# Patient Record
Sex: FEMALE | Race: WHITE | NOT HISPANIC OR LATINO | Employment: OTHER | URBAN - NONMETROPOLITAN AREA
[De-identification: names, ages, dates, MRNs, and addresses within clinical notes are randomized per-mention and may not be internally consistent; named-entity substitution may affect disease eponyms.]

---

## 2021-04-08 DIAGNOSIS — Z23 ENCOUNTER FOR IMMUNIZATION: ICD-10-CM

## 2021-04-16 ENCOUNTER — APPOINTMENT (EMERGENCY)
Dept: CT IMAGING | Facility: HOSPITAL | Age: 71
End: 2021-04-16
Payer: MEDICARE

## 2021-04-16 ENCOUNTER — HOSPITAL ENCOUNTER (EMERGENCY)
Facility: HOSPITAL | Age: 71
Discharge: HOME | End: 2021-04-16
Attending: EMERGENCY MEDICINE | Admitting: EMERGENCY MEDICINE
Payer: MEDICARE

## 2021-04-16 VITALS
HEIGHT: 62 IN | WEIGHT: 240 LBS | OXYGEN SATURATION: 98 % | RESPIRATION RATE: 20 BRPM | TEMPERATURE: 97.5 F | BODY MASS INDEX: 44.16 KG/M2 | HEART RATE: 88 BPM

## 2021-04-16 DIAGNOSIS — M54.9 BACK PAIN: Primary | ICD-10-CM

## 2021-04-16 DIAGNOSIS — R10.9 ABDOMINAL PAIN: ICD-10-CM

## 2021-04-16 LAB
ALBUMIN SERPL BCP-MCNC: 3.9 G/DL (ref 3.5–5)
ALP SERPL-CCNC: 68 U/L (ref 46–116)
ALT SERPL W P-5'-P-CCNC: 27 U/L (ref 12–78)
ANION GAP SERPL CALCULATED.3IONS-SCNC: 9 MMOL/L (ref 4–13)
AST SERPL W P-5'-P-CCNC: 15 U/L (ref 5–45)
BASOPHILS # BLD AUTO: 0.03 THOUSANDS/ΜL (ref 0–0.1)
BASOPHILS NFR BLD AUTO: 0 % (ref 0–1)
BILIRUB SERPL-MCNC: 0.33 MG/DL (ref 0.2–1)
BILIRUB UR QL STRIP: NEGATIVE
BUN SERPL-MCNC: 18 MG/DL (ref 5–25)
CALCIUM SERPL-MCNC: 9.7 MG/DL (ref 8.3–10.1)
CHLORIDE SERPL-SCNC: 101 MMOL/L (ref 100–108)
CLARITY UR: CLEAR
CO2 SERPL-SCNC: 32 MMOL/L (ref 21–32)
COLOR UR: YELLOW
CREAT SERPL-MCNC: 1.04 MG/DL (ref 0.6–1.3)
EOSINOPHIL # BLD AUTO: 0.16 THOUSAND/ΜL (ref 0–0.61)
EOSINOPHIL NFR BLD AUTO: 1 % (ref 0–6)
ERYTHROCYTE [DISTWIDTH] IN BLOOD BY AUTOMATED COUNT: 13 % (ref 11.6–15.1)
GFR SERPL CREATININE-BSD FRML MDRD: 54 ML/MIN/1.73SQ M
GLUCOSE SERPL-MCNC: 118 MG/DL (ref 65–140)
GLUCOSE UR STRIP-MCNC: NEGATIVE MG/DL
HCT VFR BLD AUTO: 45.1 % (ref 34.8–46.1)
HGB BLD-MCNC: 14.7 G/DL (ref 11.5–15.4)
HGB UR QL STRIP.AUTO: NEGATIVE
IMM GRANULOCYTES # BLD AUTO: 0.03 THOUSAND/UL (ref 0–0.2)
IMM GRANULOCYTES NFR BLD AUTO: 0 % (ref 0–2)
KETONES UR STRIP-MCNC: NEGATIVE MG/DL
LEUKOCYTE ESTERASE UR QL STRIP: NEGATIVE
LYMPHOCYTES # BLD AUTO: 3.57 THOUSANDS/ΜL (ref 0.6–4.47)
LYMPHOCYTES NFR BLD AUTO: 30 % (ref 14–44)
MCH RBC QN AUTO: 30 PG (ref 26.8–34.3)
MCHC RBC AUTO-ENTMCNC: 32.6 G/DL (ref 31.4–37.4)
MCV RBC AUTO: 92 FL (ref 82–98)
MONOCYTES # BLD AUTO: 0.96 THOUSAND/ΜL (ref 0.17–1.22)
MONOCYTES NFR BLD AUTO: 8 % (ref 4–12)
NEUTROPHILS # BLD AUTO: 7.11 THOUSANDS/ΜL (ref 1.85–7.62)
NEUTS SEG NFR BLD AUTO: 61 % (ref 43–75)
NITRITE UR QL STRIP: NEGATIVE
NRBC BLD AUTO-RTO: 0 /100 WBCS
PH UR STRIP.AUTO: 7 [PH]
PLATELET # BLD AUTO: 349 THOUSANDS/UL (ref 149–390)
PMV BLD AUTO: 9 FL (ref 8.9–12.7)
POTASSIUM SERPL-SCNC: 3.1 MMOL/L (ref 3.5–5.3)
PROT SERPL-MCNC: 8.4 G/DL (ref 6.4–8.2)
PROT UR STRIP-MCNC: NEGATIVE MG/DL
RBC # BLD AUTO: 4.9 MILLION/UL (ref 3.81–5.12)
SODIUM SERPL-SCNC: 142 MMOL/L (ref 136–145)
SP GR UR STRIP.AUTO: 1.01 (ref 1–1.03)
UROBILINOGEN UR QL STRIP.AUTO: 0.2 E.U./DL
WBC # BLD AUTO: 11.86 THOUSAND/UL (ref 4.31–10.16)

## 2021-04-16 PROCEDURE — 36415 COLL VENOUS BLD VENIPUNCTURE: CPT | Performed by: EMERGENCY MEDICINE

## 2021-04-16 PROCEDURE — 99284 EMERGENCY DEPT VISIT MOD MDM: CPT | Performed by: EMERGENCY MEDICINE

## 2021-04-16 PROCEDURE — 99284 EMERGENCY DEPT VISIT MOD MDM: CPT

## 2021-04-16 PROCEDURE — 81003 URINALYSIS AUTO W/O SCOPE: CPT | Performed by: EMERGENCY MEDICINE

## 2021-04-16 PROCEDURE — 80053 COMPREHEN METABOLIC PANEL: CPT | Performed by: EMERGENCY MEDICINE

## 2021-04-16 PROCEDURE — 85025 COMPLETE CBC W/AUTO DIFF WBC: CPT | Performed by: EMERGENCY MEDICINE

## 2021-04-16 PROCEDURE — 74177 CT ABD & PELVIS W/CONTRAST: CPT

## 2021-04-16 PROCEDURE — 96374 THER/PROPH/DIAG INJ IV PUSH: CPT

## 2021-04-16 RX ORDER — POTASSIUM CHLORIDE 20 MEQ/1
40 TABLET, EXTENDED RELEASE ORAL ONCE
Status: COMPLETED | OUTPATIENT
Start: 2021-04-16 | End: 2021-04-16

## 2021-04-16 RX ORDER — MELOXICAM 15 MG/1
15 TABLET ORAL DAILY
Qty: 30 TABLET | Refills: 0 | Status: SHIPPED | OUTPATIENT
Start: 2021-04-16

## 2021-04-16 RX ORDER — METOPROLOL SUCCINATE 50 MG/1
50 TABLET, EXTENDED RELEASE ORAL DAILY
COMMUNITY

## 2021-04-16 RX ORDER — KETOROLAC TROMETHAMINE 30 MG/ML
15 INJECTION, SOLUTION INTRAMUSCULAR; INTRAVENOUS ONCE
Status: COMPLETED | OUTPATIENT
Start: 2021-04-16 | End: 2021-04-16

## 2021-04-16 RX ORDER — SIMVASTATIN 20 MG
20 TABLET ORAL
COMMUNITY

## 2021-04-16 RX ORDER — TRIAMTERENE AND HYDROCHLOROTHIAZIDE 37.5; 25 MG/1; MG/1
1 CAPSULE ORAL EVERY MORNING
COMMUNITY

## 2021-04-16 RX ORDER — MULTIVITAMIN
1 CAPSULE ORAL DAILY
COMMUNITY

## 2021-04-16 RX ORDER — PHENOL 1.4 %
600 AEROSOL, SPRAY (ML) MUCOUS MEMBRANE 2 TIMES DAILY WITH MEALS
COMMUNITY

## 2021-04-16 RX ADMIN — IOHEXOL 100 ML: 350 INJECTION, SOLUTION INTRAVENOUS at 19:48

## 2021-04-16 RX ADMIN — KETOROLAC TROMETHAMINE 15 MG: 30 INJECTION, SOLUTION INTRAMUSCULAR at 19:10

## 2021-04-16 RX ADMIN — POTASSIUM CHLORIDE 40 MEQ: 1500 TABLET, EXTENDED RELEASE ORAL at 20:24

## 2021-04-16 NOTE — ED PROVIDER NOTES
History  Chief Complaint   Patient presents with    Back Pain     pt dev thoracic back pain that radiates around both flank areas on Wed Worse today  Patient reports that on Wednesday she began to feel upper back pain which by Thursday seem to radiate down to her both flanks and now has radiated around to her right upper abdomen  Patient reports that she has had no nausea or vomiting  She denies any fevers  She reports the pain as dull and achy  It is not exacerbated by movement  There was no trauma  Patient relates this pain to previous episode of diverticulitis  She reports that she has diverticuli high up  No history of colon cancer  Denies any history of intra-abdominal surgeries  History provided by:  Patient  Back Pain  Location:  Thoracic spine  Quality:  Aching  Radiates to: Right upper abdomen  Pain severity:  Moderate  Pain is:  Same all the time  Onset quality:  Gradual  Duration:  2 days  Timing:  Intermittent  Progression:  Waxing and waning  Context: not falling, not jumping from heights and not lifting heavy objects    Relieved by:  Nothing  Worsened by: Movement  Ineffective treatments:  None tried  Associated symptoms: abdominal pain    Associated symptoms: no chest pain, no dysuria, no fever, no headaches and no weakness    Abdominal pain:     Location:  R flank, L flank and RUQ    Quality: aching and cramping      Severity:  Moderate    Onset quality:  Gradual    Duration:  3 days    Timing:  Intermittent    Progression:  Waxing and waning      Prior to Admission Medications   Prescriptions Last Dose Informant Patient Reported? Taking?    Multiple Vitamin (multivitamin) capsule   Yes Yes   Sig: Take 1 capsule by mouth daily   Potassium 75 MG TABS 4/16/2021 at Unknown time  Yes Yes   Sig: Take 1 tablet by mouth daily   calcium carbonate (OS-KACIE) 600 MG tablet   Yes Yes   Sig: Take 600 mg by mouth 2 (two) times a day with meals   metoprolol succinate (TOPROL-XL) 50 mg 24 hr tablet 2021 at Unknown time  Yes Yes   Sig: Take 50 mg by mouth daily   simvastatin (ZOCOR) 20 mg tablet 4/15/2021 at Unknown time  Yes Yes   Sig: Take 20 mg by mouth daily at bedtime   triamterene-hydrochlorothiazide (DYAZIDE) 37 5-25 mg per capsule 2021 at Unknown time  Yes Yes   Sig: Take 1 capsule by mouth every morning      Facility-Administered Medications: None       Past Medical History:   Diagnosis Date    Arthritis     Asthma     Hypertension        Past Surgical History:   Procedure Laterality Date    BACK SURGERY      HAND FRACTURE REPAIR Right     TONSILLECTOMY         History reviewed  No pertinent family history  I have reviewed and agree with the history as documented  E-Cigarette/Vaping    E-Cigarette Use Never User      E-Cigarette/Vaping Substances     Social History     Tobacco Use    Smoking status: Former Smoker     Quit date: 1990     Years since quittin 0    Smokeless tobacco: Never Used   Substance Use Topics    Alcohol use: Never     Frequency: Never    Drug use: Never       Review of Systems   Constitutional: Positive for appetite change  Negative for activity change, fatigue and fever  HENT: Negative for congestion, ear pain, rhinorrhea, sinus pressure and sore throat  Eyes: Negative  Negative for redness and itching  Respiratory: Negative for cough, chest tightness, shortness of breath and wheezing  Cardiovascular: Negative for chest pain, palpitations and leg swelling  Gastrointestinal: Positive for abdominal pain and constipation  Negative for diarrhea, nausea and vomiting  Endocrine: Negative  Genitourinary: Negative for dysuria, flank pain and frequency  Musculoskeletal: Positive for back pain  Negative for arthralgias and myalgias  Skin: Negative  Negative for pallor and rash  Allergic/Immunologic: Negative  Neurological: Negative for dizziness, weakness and headaches  Hematological: Negative  Psychiatric/Behavioral: Negative  All other systems reviewed and are negative  Physical Exam  Physical Exam  Vitals signs and nursing note reviewed  Constitutional:       General: She is awake  She is not in acute distress  Appearance: Normal appearance  She is well-developed  She is obese  She is not ill-appearing or toxic-appearing  HENT:      Head: Normocephalic and atraumatic  Hair is normal       Jaw: No pain on movement  Right Ear: External ear normal       Left Ear: External ear normal       Nose: Nose normal    Eyes:      General: Lids are normal  No scleral icterus  Extraocular Movements: Extraocular movements intact  Pupils: Pupils are equal, round, and reactive to light  Neck:      Musculoskeletal: Normal range of motion and neck supple  Cardiovascular:      Rate and Rhythm: Normal rate and regular rhythm  Heart sounds: Normal heart sounds  No murmur  Pulmonary:      Effort: Pulmonary effort is normal  No respiratory distress  Breath sounds: Normal breath sounds  No stridor  No wheezing or rales  Abdominal:      General: Abdomen is flat  There is no distension  Palpations: Abdomen is soft  Tenderness: There is no abdominal tenderness  There is no right CVA tenderness, left CVA tenderness or guarding  Musculoskeletal: Normal range of motion  General: No deformity  Skin:     General: Skin is warm and dry  Coloration: Skin is not jaundiced or pale  Findings: No rash  Neurological:      Mental Status: She is alert and oriented to person, place, and time  Mental status is at baseline  Cranial Nerves: No cranial nerve deficit     Psychiatric:         Attention and Perception: Attention normal          Mood and Affect: Mood normal          Speech: Speech normal          Behavior: Behavior normal          Vital Signs  ED Triage Vitals [04/16/21 1829]   Temperature Pulse Respirations BP SpO2   97 5 °F (36 4 °C) 88 20 -- 98 % Temp Source Heart Rate Source Patient Position - Orthostatic VS BP Location FiO2 (%)   Temporal Monitor Sitting Left arm --      Pain Score       7           Vitals:    04/16/21 1829   Pulse: 88   Patient Position - Orthostatic VS: Sitting         Visual Acuity      ED Medications  Medications   potassium chloride (K-DUR,KLOR-CON) CR tablet 40 mEq (has no administration in time range)   ketorolac (TORADOL) injection 15 mg (15 mg Intravenous Given 4/16/21 1910)   iohexol (OMNIPAQUE) 350 MG/ML injection (SINGLE-DOSE) 100 mL (100 mL Intravenous Given 4/16/21 1948)       Diagnostic Studies  Results Reviewed     Procedure Component Value Units Date/Time    Comprehensive metabolic panel [[id number]]  (Abnormal) Collected: 04/16/21 1909    Lab Status: Final result Specimen: Blood from Arm, Right Updated: 04/16/21 1931     Sodium 142 mmol/L      Potassium 3 1 mmol/L      Chloride 101 mmol/L      CO2 32 mmol/L      ANION GAP 9 mmol/L      BUN 18 mg/dL      Creatinine 1 04 mg/dL      Glucose 118 mg/dL      Calcium 9 7 mg/dL      AST 15 U/L      ALT 27 U/L      Alkaline Phosphatase 68 U/L      Total Protein 8 4 g/dL      Albumin 3 9 g/dL      Total Bilirubin 0 33 mg/dL      eGFR 54 ml/min/1 73sq m     Narrative:      [place] guidelines for Chronic Kidney Disease (CKD):     Stage 1 with normal or high GFR (GFR > 90 mL/min/1 73 square meters)    Stage 2 Mild CKD (GFR = 60-89 mL/min/1 73 square meters)    Stage 3A Moderate CKD (GFR = 45-59 mL/min/1 73 square meters)    Stage 3B Moderate CKD (GFR = 30-44 mL/min/1 73 square meters)    Stage 4 Severe CKD (GFR = 15-29 mL/min/1 73 square meters)    Stage 5 End Stage CKD (GFR <15 mL/min/1 73 square meters)  Note: GFR calculation is accurate only with a steady state creatinine    UA w Reflex to Microscopic w Reflex to Culture [[id number]] Collected: 04/16/21 1908    Lab Status: Final result Specimen: Urine, Clean Catch Updated: 04/16/21 1918     Color, UA Yellow     Clarity, UA Clear     Specific Gravity, UA 1 015     pH, UA 7 0     Leukocytes, UA Negative     Nitrite, UA Negative     Protein, UA Negative mg/dl      Glucose, UA Negative mg/dl      Ketones, UA Negative mg/dl      Urobilinogen, UA 0 2 E U /dl      Bilirubin, UA Negative     Blood, UA Negative    CBC and differential [[id number]]  (Abnormal) Collected: 04/16/21 1909    Lab Status: Final result Specimen: Blood from Arm, Right Updated: 04/16/21 1916     WBC 11 86 Thousand/uL      RBC 4 90 Million/uL      Hemoglobin 14 7 g/dL      Hematocrit 45 1 %      MCV 92 fL      MCH 30 0 pg      MCHC 32 6 g/dL      RDW 13 0 %      MPV 9 0 fL      Platelets 987 Thousands/uL      nRBC 0 /100 WBCs      Neutrophils Relative 61 %      Immat GRANS % 0 %      Lymphocytes Relative 30 %      Monocytes Relative 8 %      Eosinophils Relative 1 %      Basophils Relative 0 %      Neutrophils Absolute 7 11 Thousands/µL      Immature Grans Absolute 0 03 Thousand/uL      Lymphocytes Absolute 3 57 Thousands/µL      Monocytes Absolute 0 96 Thousand/µL      Eosinophils Absolute 0 16 Thousand/µL      Basophils Absolute 0 03 Thousands/µL                  CT abdomen pelvis with contrast   Final Result by Grady Lucero MD (04/16 2003)      Abutting and inseparable from the gastric wall in the left upper quadrant is a well rounded solid lesion measuring 2 4 x 2 4 cm  This could represent a splenule (unusual location) or gastric wall mass  Recommend nonemergent splenic nuclear medicine    scintigraphy for further evaluation  Fatty liver  Workstation performed: [id number]                    Procedures  Procedures         ED Course                             SBIRT 20yo+      Most Recent Value   SBIRT (23 yo +)   In order to provide better care to our patients, we are screening all of our patients for alcohol and drug use  Would it be okay to ask you these screening questions?   Yes Filed at: 04/16/2021 5650   Initial Alcohol Screen: US AUDIT-C    1  How often do you have a drink containing alcohol?  0 Filed at: 04/16/2021 1839   2  How many drinks containing alcohol do you have on a typical day you are drinking? 0 Filed at: 04/16/2021 1839   3b  FEMALE Any Age, or MALE 65+: How often do you have 4 or more drinks on one occassion? 0 Filed at: 04/16/2021 1839   Audit-C Score  0 Filed at: 04/16/2021 1839   BAKARI: How many times in the past year have you    Used an illegal drug or used a prescription medication for non-medical reasons? Never Filed at: 04/16/2021 1839                    MDM  Number of Diagnoses or Management Options  Abdominal pain: new and requires workup  Back pain: new and requires workup     Amount and/or Complexity of Data Reviewed  Clinical lab tests: ordered and reviewed  Tests in the radiology section of CPT®: ordered and reviewed    Risk of Complications, Morbidity, and/or Mortality  Presenting problems: moderate  Diagnostic procedures: moderate  Management options: moderate    Patient Progress  Patient progress: stable      Disposition  Final diagnoses:   Back pain   Abdominal pain     Time reflects when diagnosis was documented in both MDM as applicable and the Disposition within this note     Time User Action Codes Description Comment    4/16/2021  7:38 PM Levorn Poot Add [M54 9] Back pain     4/16/2021  7:38 PM Levorn Poot Add [R10 9] Abdominal pain       ED Disposition     ED Disposition Condition Date/Time Comment    Discharge Stable Fri Apr 16, 2021  8:15 PM [address] discharge to home/self care  Follow-up Information     Follow up With Specialties Details Why Jerome [address] 71 , DO Family Medicine In 2 weeks Even in well; you will need further evaluation of findings on your CT scan   [facility] [facility]            Patient's Medications   Discharge Prescriptions    MELOXICAM (MOBIC) 15 MG TABLET    Take 1 tablet (15 mg total) by mouth daily       Start Date: 4/16/2021 End Date: --       Order Dose: 15 mg       Quantity: 30 tablet    Refills: 0     No discharge procedures on file      PDMP Review     None          ED Provider  Electronically Signed by           Ricardo Barrientos DO  04/16/21 2018

## 2021-04-17 NOTE — DISCHARGE INSTRUCTIONS
There is no findings on your CT scan this evening to account for your pain that you had described  The discomfort may be related to back pain and as such we have prescribed you an anti-inflammatory medication to help with this  Follow-up with your family doctor's very important  There was an unrelated finding on her CT scan tonight near the area of your stomach which may be related to your spleen or the stomach itself  This will require further imaging either through an MRI or nuclear medicine study which your family doctor can arrange for you  Please return with any worsening  Thank you for choosing the emergency department at [facility]  We appreciated the opportunity and privilege to address your healthcare needs  We remain available to you should you require additional evaluation or assistance  We value your feedback and would appreciate the opportunity to address anything you identified as an opportunity to improve or where we excelled  If there are colleagues who deserve special recognition, please let us know! We hope you are feeling better soon! Vaccination is part of comprehensive strategy to prevent the spread of COVID-19  Your doctor today strongly recommends that if the vaccine becomes available to you and you have no contraindications, receiving it would be beneficial to you, your family and the community  Even with the vaccination, it is still important to wear a mask, physically distance and have good hand hygiene  Thank you for helping to prevent the spread!

## 2025-01-08 RX ORDER — POTASSIUM CHLORIDE 1500 MG/1
20 TABLET, EXTENDED RELEASE ORAL
COMMUNITY

## 2025-01-08 RX ORDER — IBUPROFEN 600 MG/1
600 TABLET, FILM COATED ORAL EVERY 6 HOURS PRN
COMMUNITY
End: 2025-01-13 | Stop reason: ALTCHOICE

## 2025-01-13 ENCOUNTER — OFFICE VISIT (OUTPATIENT)
Dept: PODIATRY | Age: 75
End: 2025-01-13
Payer: COMMERCIAL

## 2025-01-13 ENCOUNTER — HOSPITAL ENCOUNTER (OUTPATIENT)
Dept: RADIOLOGY | Facility: CLINIC | Age: 75
Discharge: HOME | End: 2025-01-13
Payer: COMMERCIAL

## 2025-01-13 VITALS — WEIGHT: 236.8 LBS | BODY MASS INDEX: 43.58 KG/M2 | HEIGHT: 62 IN

## 2025-01-13 DIAGNOSIS — M54.9 BACK PAIN WITH HISTORY OF SPINAL SURGERY: ICD-10-CM

## 2025-01-13 DIAGNOSIS — M79.672 PAIN IN BOTH FEET: ICD-10-CM

## 2025-01-13 DIAGNOSIS — M79.671 PAIN IN BOTH FEET: ICD-10-CM

## 2025-01-13 DIAGNOSIS — R20.0 NUMBNESS OF TOES: ICD-10-CM

## 2025-01-13 DIAGNOSIS — M79.672 PAIN IN BOTH FEET: Primary | ICD-10-CM

## 2025-01-13 DIAGNOSIS — Z98.890 BACK PAIN WITH HISTORY OF SPINAL SURGERY: ICD-10-CM

## 2025-01-13 DIAGNOSIS — M79.671 PAIN IN BOTH FEET: Primary | ICD-10-CM

## 2025-01-13 DIAGNOSIS — M19.072 ARTHRITIS OF LEFT FOOT: ICD-10-CM

## 2025-01-13 PROCEDURE — 99203 OFFICE O/P NEW LOW 30 MIN: CPT | Performed by: STUDENT IN AN ORGANIZED HEALTH CARE EDUCATION/TRAINING PROGRAM

## 2025-01-13 PROCEDURE — 73630 X-RAY EXAM OF FOOT: CPT

## 2025-01-13 RX ORDER — NYSTATIN 100000 U/G
CREAM TOPICAL AS NEEDED
COMMUNITY
Start: 2025-01-03

## 2025-01-13 NOTE — PATIENT INSTRUCTIONS
Foot Pain Home Therapy    Wear supportive shoes at all times. Avoid flip-flops, flat sandals, barefoot walking (never walk barefoot, even at home). Generally avoid shoes that are too flexible and bend in the arch. Your shoes should only slightly bend in the toe area, not the middle. Running sneakers are often the best choice.  Supportive sneaker brands: Oshea, On Cloud, Hoka, Altra, New Balance, Asics, Mizuno  [employer] (discount if mention Dr referred)  Fleet Feet [place]  [facility]   [place] [employer] [place]  Supportive daily shoe brands: Vionic, Orthofeet, Fozia, Dansko, Chacos, Barrera, Teva, Birkenstock  Supportive home shoes: Marco Mendoza (recovery slides)

## 2025-01-13 NOTE — ASSESSMENT & PLAN NOTE
Orders:    EMG 2 limb lower extremity; Future    Ambulatory referral to Spine & Pain Management; Future    Orthotics B/L

## 2025-01-13 NOTE — PROGRESS NOTES
Name: Shanika Flowers      : 1950      MRN: [id number]  Encounter Provider: Joaquina Soriano DPM  Encounter Date: 2025   Encounter department: [facility] [place]  :  Assessment & Plan  Pain in both feet    Orders:    XR foot 3+ vw left; Future    XR foot 3+ vw right; Future    EMG 2 limb lower extremity; Future    Orthotics B/L    Ambulatory referral to Physical Therapy; Future    Numbness of toes    Orders:    EMG 2 limb lower extremity; Future    Ambulatory referral to Spine & Pain Management; Future    Orthotics B/L    Back pain with history of spinal surgery    Orders:    Ambulatory referral to Spine & Pain Management; Future    Arthritis of left foot    Orders:    Orthotics B/L    Imaging Reviewed at this visit (I personally reviewed/independently interpreted images and reports in PACS)  XR right and left foot WB 6v 25: No acute osseous abnormalities noted. B/L plantar and posterior calcaneal spurs. Midfoot arthritis. Shortened 1st metatarsal. H/o 5th HT procedure. HTs.       PLAN:  I reviewed clinical exam and radiographic imaging (XR) with patient in detail today. I have discussed with the patient the pathophysiology of this diagnosis and reviewed how the examination correlates with this diagnosis.  Patient has B/L forefoot numbness w/ h/o lower back surgery. EMG B/L LE ordered. Pain mngmt consult ordered.  Left foot pain likely arthritic in nature. FL-guided injection discussed however patient defers  CMOs with metatarsal pads rx'd (also to assist in offloading of metatarsal heads which may cause worsening numbness) via [facility]  F/u 6mo for recheck    History of Present Illness   HPI  Shanika Flowers is a 74 y.o. female who presents to clinic for left foot. Notes pain to left dorsal foot along 1st TMTJ. Notes numbness and sometimes tinglings to toes and plantar feet. Issues for years. Shoes feel better.   H/o CSI for heel pain. No heel pain today.       Review  "of Systems   Constitutional:  Negative for activity change, chills and fever.   HENT: Negative.     Respiratory:  Negative for cough, chest tightness and shortness of breath.    Cardiovascular:  Negative for chest pain and leg swelling.   Endocrine: Negative.    Genitourinary: Negative.    Neurological:  Positive for numbness.   Psychiatric/Behavioral: Negative.  Negative for agitation and behavioral problems.           Objective   Ht 5' 2\" (1.575 m)   Wt 107 kg (236 lb 12.8 oz)   BMI 43.31 kg/m²      Physical Exam  Constitutional:       General: She is not in acute distress.     Appearance: Normal appearance. She is obese. She is not ill-appearing.   Cardiovascular:      Comments: B/L DP pulses 1/4. B/L PT pulses nonpalpable due to edema. Legs to toes warm to cool. Diminished pedal hair. B/L LE mild edema with varicosities.   Pulmonary:      Effort: Pulmonary effort is normal.   Musculoskeletal:         General: Tenderness (dorsal left foot over midfoot) and deformity (HTs) present. Normal range of motion.      Comments: No pain with ROM left TMTJs, STJ, TNJ, NCJ   Skin:     General: Skin is warm.      Capillary Refill: Capillary refill takes less than 2 seconds.      Comments: Atrophic skin to LE - thin, dry and shiny.   No open wounds.     Neurological:      General: No focal deficit present.      Mental Status: She is alert and oriented to person, place, and time.      Comments: + numbness to B/L feet lesser metatarsal heads distal to toes 2-5.   Psychiatric:         Mood and Affect: Mood normal.           "

## 2025-01-13 NOTE — ASSESSMENT & PLAN NOTE
Orders:    XR foot 3+ vw left; Future    XR foot 3+ vw right; Future    EMG 2 limb lower extremity; Future    Orthotics B/L    Ambulatory referral to Physical Therapy; Future

## 2025-01-17 NOTE — PROGRESS NOTES
Orthotic Evaluation    Today's date: 25  Patient name: Shanika Flowers  : 1950  MRN: [id number]  Referring provider: Joaquina Soriano DPM  Dx:   Encounter Diagnosis     ICD-10-CM    1. Pain in both feet  M79.671 Ambulatory referral to Physical Therapy    M79.672                       Subjective:    Shanika presents today for orthotic evaluation. she complains of pain, decreased strength, decreased ROM, decreased endurance, decreased joint mobility, balance dysfunction, impaired sensation, and ambulatory dysfunction with functional activities including ambulation, transfers, and leisure. The patient plans to use her orthotic for walking and standing. The orthotic will be placed in a wide, size Women's 8.0    Objective:    Age: 74 y.o.  Weight:  236#    Foot Posture Index Score    Right Foot  Talar dome - +1  Talonavicular bulge - +1  Medial longitudinal arch - +1  Malleolar curve - +1   Calcaneal eversion - +2  Too many toes - +2  Total Score R = 8    Left Foot  Talar dome - +1  Talonavicular bulge - +1  Medial longitudinal arch - +2  Malleolar curve - +1   Calcaneal eversion - +2  Too many toes - +1  Total Score L = 8    Reference Values  Normal =    0 to +5  Pronated =  +6 to +9 Highly Pronated =  10+  Supinated =   -1 to -4 Highly Supinated = -5 to -12    Objective   PT notes decrease ROM and strength t/o the bilateral feet and ankle with loss of DF, inversion, eversion, and hallux extension with+ pronated feet position with + pain t/o the MT heads of the bilateral feet with + compression test.  PT notes demonstration of impaired gait pattern with decrease tibial advancement, decrease stance on the bilateral feet, decrease step length, and decrease push off with + collapsing of the medial arch during stance with step over.      Assessment:    Patient requires custom foot orthosis with a full length, street cut and wide Modified Shocker with bilateral deep heel cups, bilateral 3* medial wedge, and  bilateral medium met bar to correct altered gait mechanics. Patient is not currently controlled by her current shoe wear. Patient was educated on the design of the custom orthotic and it's ability to offload her current pathology. Patient was also educated on potential shoe wear changes with device, break-in period, and skin checks to avoid potential skin break down.     Orthotic goals:  1) Patient will have custom foot orthoses fitted to her shoe.   2) Patient will be compliant with break-in period of custom foot orthoses as prescribed by PT.   3) Patient will be compliant with custom foot orthoses use as prescribed by PT.     Plan:    Planned therapy interventions: orthotic fitting/training  Duration in visits: 2

## 2025-01-20 ENCOUNTER — EVALUATION (OUTPATIENT)
Dept: PHYSICAL THERAPY | Facility: CLINIC | Age: 75
End: 2025-01-20
Payer: COMMERCIAL

## 2025-01-20 DIAGNOSIS — M79.671 PAIN IN BOTH FEET: ICD-10-CM

## 2025-01-20 DIAGNOSIS — M79.672 PAIN IN BOTH FEET: ICD-10-CM

## 2025-01-20 PROCEDURE — 97760 ORTHOTIC MGMT&TRAING 1ST ENC: CPT | Performed by: PHYSICAL THERAPIST

## 2025-02-05 ENCOUNTER — HOSPITAL ENCOUNTER (OUTPATIENT)
Dept: RADIOLOGY | Facility: CLINIC | Age: 75
Discharge: HOME | End: 2025-02-05
Payer: COMMERCIAL

## 2025-02-05 ENCOUNTER — CONSULT (OUTPATIENT)
Dept: PAIN MEDICINE | Facility: CLINIC | Age: 75
End: 2025-02-05
Payer: COMMERCIAL

## 2025-02-05 VITALS — BODY MASS INDEX: 44.13 KG/M2 | HEIGHT: 62 IN | WEIGHT: 239.8 LBS

## 2025-02-05 DIAGNOSIS — M96.1 POSTLAMINECTOMY SYNDROME OF LUMBAR REGION: ICD-10-CM

## 2025-02-05 DIAGNOSIS — Z98.890 BACK PAIN WITH HISTORY OF SPINAL SURGERY: ICD-10-CM

## 2025-02-05 DIAGNOSIS — M47.26 OTHER SPONDYLOSIS WITH RADICULOPATHY, LUMBAR REGION: ICD-10-CM

## 2025-02-05 DIAGNOSIS — R20.0 NUMBNESS OF TOES: ICD-10-CM

## 2025-02-05 DIAGNOSIS — M54.9 BACK PAIN WITH HISTORY OF SPINAL SURGERY: ICD-10-CM

## 2025-02-05 DIAGNOSIS — E66.01 OBESITY, MORBID (HCC): ICD-10-CM

## 2025-02-05 DIAGNOSIS — M96.1 POSTLAMINECTOMY SYNDROME OF LUMBAR REGION: Primary | ICD-10-CM

## 2025-02-05 PROCEDURE — G2211 COMPLEX E/M VISIT ADD ON: HCPCS | Performed by: ANESTHESIOLOGY

## 2025-02-05 PROCEDURE — 72110 X-RAY EXAM L-2 SPINE 4/>VWS: CPT

## 2025-02-05 PROCEDURE — 99024 POSTOP FOLLOW-UP VISIT: CPT | Performed by: ANESTHESIOLOGY

## 2025-02-05 RX ORDER — GABAPENTIN 300 MG/1
300 CAPSULE ORAL 3 TIMES DAILY
Qty: 90 CAPSULE | Refills: 2 | Status: SHIPPED | OUTPATIENT
Start: 2025-02-05 | End: 2025-02-05

## 2025-02-05 RX ORDER — GABAPENTIN 300 MG/1
300 CAPSULE ORAL 3 TIMES DAILY
Qty: 90 CAPSULE | Refills: 2 | Status: SHIPPED | OUTPATIENT
Start: 2025-02-05

## 2025-02-05 NOTE — PATIENT INSTRUCTIONS
Patient Education     Core Strengthening Exercises on Back or on Hands and Knees   About this topic   Your core muscles are in your chest, back, buttock, and stomach area. They are your abdominal, back, and pelvis muscles. These muscles help keep your body stable when using your arms or legs. They also help with balance and posture. There are many exercises you can do to keep these muscles strong.  If you have back problems like a compression fracture or a ruptured disc, doing some of these exercises could make your problem worse. Some of these exercises may cause lower back pain.  General   Before starting with a program, ask your doctor if you are healthy enough to do these exercises. Your doctor may have you work with a , chiropractor, or physical therapist to make a safe exercise program to meet your needs.  Strengthening Exercises   Strengthening exercises keep your muscles firm and strong. Start by repeating each exercise 2 to 3 times. Work up to doing each exercise 10 times. Try to do the exercises 2 to 3 times each day. Hold each exercise for 3 to 5 seconds. Do all exercises slowly.  Hip lifts ? Lie on your back with your knees bent and feet flat on the floor. Tighten your stomach muscles and push your heels into the floor to lift your buttocks off the floor. Relax.  Pelvic tilts ? Lie on your back with your knees bent and feet flat on the floor. Tighten your stomach muscles and press your lower back down to the floor. Relax.  Straight leg raises lying down ? Lie on your back with one leg straight. Bend your other knee so the foot is flat on the bed. Keeping your leg straight, lift the leg up to the level of your other knee. Lower it back down. Repeat with the other leg.  Knee flex lying down ? Lie on your back with both knees bent and your feet flat on the floor. Tighten your belly muscles. Raise one leg up and back down as if you are marching in slow motion. Keep belly muscles tight while you move  your leg. Switch legs. To make this exercise harder, raise both arms straight up in the air. Tighten your belly muscles. When you raise one leg up, reach the opposite arm over your head. Switch, moving the opposite arm and leg until you have done 10 repetitions on each side.  Abdominal crunches ? Lie on your back with both knees bent. Keep your feet flat on the floor. Place your hands in one of these positions. Try starting with the first position since it is the easiest. As you get better, use the other positions to make it harder.  Crunches with arms at sides.  Crunches with arms across chest.  Crunches with arms behind head. Be careful not to interlock your fingers behind your neck or head while doing crunches. This may add tension to your neck and cause strain.  Look at the ceiling. Tighten your belly muscles and lift your shoulders and upper back off the floor. Breathe out while you are doing this. Lower your shoulders to the floor. Breathe in while you are doing this. Relax your belly muscles all the way before starting another crunch.  Arm and leg lifts on hands and knees ? Start on your hands and knees. With all of these exercises, keep your back as level as possible. If you are having trouble with this, you may want to put a small object on your back such as a book. If it falls off, you are not keeping your back level enough during the exercise.  Lift one arm up to shoulder level and hold. Lower it back down. Now, lift up the other arm and hold.  Lift one leg up and kick it straight out until it is in line with your back and hold. Lower it back down. Now, lift up the other leg and hold.  Lift one arm and the OPPOSITE leg up at the same time and hold. Lower them down. Now, repeat using the other arm and leg. This is a very hard exercise. It may take time to be able to do this.               What will the results be?   Stronger core  Better balance  More toned belly and back muscles  Easier to do daily  activities  Better sports performance  Less low back pain  Helpful tips   Stay active and work out to keep your muscles strong and flexible.  Keep a healthy weight to avoid putting too much stress on your spine. Eat a healthy diet to keep your muscles healthy.  Be sure you do not hold your breath when exercising. This can raise your blood pressure. If you tend to hold your breath, try counting out loud when exercising. If any exercise bothers you, stop right away.  Try walking or cycling at an easy pace for a few minutes to warm up your muscles. Do this again after exercising.  Exercise may be slightly uncomfortable, but you should not have sharp pains. If you do get sharp pains, stop what you are doing. If the sharp pains continue, call your doctor.  Last Reviewed Date   2021-03-18  Consumer Information Use and Disclaimer   This generalized information is a limited summary of diagnosis, treatment, and/or medication information. It is not meant to be comprehensive and should be used as a tool to help the user understand and/or assess potential diagnostic and treatment options. It does NOT include all information about conditions, treatments, medications, side effects, or risks that may apply to a specific patient. It is not intended to be medical advice or a substitute for the medical advice, diagnosis, or treatment of a health care provider based on the health care provider's examination and assessment of a patient’s specific and unique circumstances. Patients must speak with a health care provider for complete information about their health, medical questions, and treatment options, including any risks or benefits regarding use of medications. This information does not endorse any treatments or medications as safe, effective, or approved for treating a specific patient. [employer] and its affiliates disclaim any warranty or liability relating to this information or the use thereof. The use of this information  is governed by the Terms of Use, available at https://www.[employer].com/en/know/clinical-effectiveness-terms   Copyright   Copyright © 2024 [employer] and its affiliates and/or licensors. All rights reserved.

## 2025-02-05 NOTE — PROGRESS NOTES
Assessment  1. Numbness of toes  -     Ambulatory referral to Spine & Pain Management  2. Back pain with history of spinal surgery  -     Ambulatory referral to Spine & Pain Management    Left, greater than right-sided lumbar radicular pain in the L5 dermatomal distribution accompanied by pain limited weakness numbness and paresthesias.  Patient has not yet participated with formal PT since lumbar fusion in 2015. Chronic pain with decreased participation with IADLs over the past 3 months.  Has been taking OTC ibuprofen and tylenol with modest benefit.  5/5 strength bilaterally, positive SLR left-sided. Reflexes 2+.  Additionally there is positive facet loading, left greater than right. Denies any gait instability, saddle anesthesia. No pertinent imaging available to review at this time. Risks, benefits alternatives epidural steroid injections thoroughly discussed with patient.  Handouts provided questions answered to patient's satisfaction.  Lifestyle modifications extensively discussed including diet, exercise and weight loss in addition to core strengthening.  Will proceed with multimodal pain therapy plan as noted below:    Plan  -xray lumbar spine flexion/extension; will f/u result  -gabapentin 300 mg t.i.d. Ordered for patient; counseled regarding sedative effects of taking this medication and provided up titration calendar.  Counseled not to take medication while driving or operating heavy machinery/using stairs  -script provided for formal physical therapy for lumbar radiculopathy; Physician directed home exercise plan as per [employer] demonstrated and handouts provided that patient plans to participate with for 1 hour, twice a week for the next 6 weeks.     There are risks associated with opioid medications, including dependence, addiction and tolerance. The patient understands and agrees to use these medications only as prescribed. Potential side effects of the medications include, but are not limited to,  constipation, drowsiness, addiction, impaired judgment and risk of fatal overdose if not taken as prescribed. The patient was warned against driving while taking sedation medications or operating heavy machinery. The patient voiced understanding. Sharing medications is a felony. At this point in time, the patient is showing no signs of addiction, abuse, diversion or suicidal ideation.     [place] Prescription Drug Monitoring Program report was reviewed and was appropriate      Complete risks and benefits including bleeding, infection, tissue reaction, nerve injury and allergic reaction were discussed. The approach was demonstrated using models and literature was provided. Verbal and written consent was obtained.     My impressions and treatment recommendations were discussed in detail with the patient who verbalized understanding and had no further questions.  Discharge instructions were provided. I personally saw and examined the patient and I agree with the above discussed plan of care.    Review of external notes including PT notes, PCP notes and specialist notes was performed at this visit in addition to review of new ordered imaging and past imaging to develop or modify multidisciplinary pain plan    No orders of the defined types were placed in this encounter.      History of Present Illness    Shanika Flowers is a 74 y.o. female with pmhx of obesity, asthma, HTN presenting with presenting with subacute lumbar radicular pain in the left, greater than right L5 dermatomal distribution. Debilitating pain limited weakness numbness and paresthesias accompany the pain. The patient rates the pain at a 8/10 accompanied by electric shock-like shooting features and crampy burning pain in the aforementioned dermatomal distributions.  The pain is worse in the mornings as well as the end of the day; exertion such as walking for long periods of time seems to exacerbate the pain.  The patient can hardly walk more than  a few blocks without having debilitating pain.  She tries to maintain an active lifestyle and finds that the current degree of pain seems to compromises her efforts.  The pain significantly impacts independent activities of daily living and contributes to significant disability.  She has not yet attempted formal PT.  She has taken nsaids, tylenol with limited relief of the pain as well. She has had good long term relief epidural steroid injections in the past. She denies any bowel or bladder dysfunction/incontinence, saddle anesthesia or gait instability.    I have personally reviewed and/or updated the patient's past medical history, past surgical history, family history, social history, current medications, allergies, and vital signs today.     Review of Systems   Constitutional:  Positive for activity change.   HENT: Negative.     Eyes: Negative.    Respiratory: Negative.     Cardiovascular: Negative.    Gastrointestinal: Negative.    Endocrine: Negative.    Genitourinary: Negative.    Musculoskeletal:  Positive for arthralgias, back pain, gait problem and myalgias.   Skin: Negative.    Allergic/Immunologic: Negative.    Neurological:  Positive for weakness and numbness.   Hematological: Negative.    Psychiatric/Behavioral: Negative.     All other systems reviewed and are negative.      Patient Active Problem List   Diagnosis    Numbness of toes    Arthritis of left foot    Pain in both feet       Past Medical History:   Diagnosis Date    Arthritis     Asthma     Hypertension        Past Surgical History:   Procedure Laterality Date    BACK SURGERY      HAND FRACTURE REPAIR Right     TONSILLECTOMY         History reviewed. No pertinent family history.    Social History     Occupational History    Not on file   Tobacco Use    Smoking status: Former     Current packs/day: 0.00     Types: Cigarettes     Quit date: 1990     Years since quittin.8    Smokeless tobacco: Never   Vaping Use    Vaping status:  "Never Used   Substance and Sexual Activity    Alcohol use: Never    Drug use: Never    Sexual activity: Not on file       Current Outpatient Medications on File Prior to Visit   Medication Sig    calcium carbonate (OS-KACIE) 600 MG tablet Take 600 mg by mouth 2 (two) times a day with meals    metoprolol succinate (TOPROL-XL) 50 mg 24 hr tablet Take 50 mg by mouth daily    Multiple Vitamin (multivitamin) capsule Take 1 capsule by mouth daily    nystatin (MYCOSTATIN) cream Apply topically as needed    potassium chloride (Klor-Con M20) 20 mEq tablet Take 20 mEq by mouth    simvastatin (ZOCOR) 20 mg tablet Take 20 mg by mouth daily at bedtime    triamterene-hydrochlorothiazide (DYAZIDE) 37.5-25 mg per capsule Take 1 capsule by mouth every morning     No current facility-administered medications on file prior to visit.       Allergies   Allergen Reactions    Cephalosporins Hives    Ciprofloxacin Hives    Penicillins Hives         Physical Exam    Ht 5' 2\" (1.575 m)   Wt 109 kg (239 lb 12.8 oz)   BMI 43.86 kg/m²     Constitutional: normal, well developed, well nourished, alert, in no distress and non-toxic and no overt pain behavior. and obese  Eyes: anicteric  HEENT: grossly intact  Neck: supple, symmetric, trachea midline and no masses   Pulmonary:even and unlabored  Cardiovascular:No edema or pitting edema present  Skin:Normal without rashes or lesions and well hydrated  Psychiatric:Mood and affect appropriate  Neurologic:Cranial Nerves II-XII grossly intact Sensation grossly intact; no clonus negative dickey's. Reflexes 2+ and brisk. SLR + right sided  Musculoskeletal:normal gait. 5/5 strength bilaterally with AROM in lower extremities. Difficulty wtith normal heel toe and tip toe walking. Significant pain with lumbar facet loading bilaterally and with lateral spine rotation, ttp over lumbar paraspinal muscles, left greater than right. Negative rafi's test, negative gaenslen's negative SIJ loading " bilaterally.    Imaging    No pertinent imaging available to review at this time.

## 2025-02-14 NOTE — PROGRESS NOTES
Daily Note     Today's date: 2025  Patient name: Shanika Flowers  : 1950  MRN: [id number]  Referring provider: Joaquina Soriano DPM  Dx:   Encounter Diagnosis     ICD-10-CM    1. Pain in both feet  M79.671     M79.672                      Subjective:  Patient reports no issues with initial fitting of the custom orthotics.  Patient verbalizes understanding of proper break in period and is aware to contact the clinic of any issues arise in the future.        Objective: See treatment diary below      Assessment: Tolerated treatment well.  PT notes the patient was instructed in proper break in period with handout provided.  PT notes the patient with no issues with initial fitting of the custom orthotics but patient is aware to contact clinic in the future if any issues arise.        Plan:  DC with Custom Orthotics.

## 2025-02-17 ENCOUNTER — OFFICE VISIT (OUTPATIENT)
Dept: PHYSICAL THERAPY | Facility: CLINIC | Age: 75
End: 2025-02-17
Payer: COMMERCIAL

## 2025-02-17 ENCOUNTER — APPOINTMENT (EMERGENCY)
Dept: RADIOLOGY | Facility: HOSPITAL | Age: 75
End: 2025-02-17
Payer: COMMERCIAL

## 2025-02-17 ENCOUNTER — HOSPITAL ENCOUNTER (EMERGENCY)
Facility: HOSPITAL | Age: 75
Discharge: HOME | End: 2025-02-17
Attending: EMERGENCY MEDICINE
Payer: COMMERCIAL

## 2025-02-17 VITALS
RESPIRATION RATE: 18 BRPM | DIASTOLIC BLOOD PRESSURE: 61 MMHG | HEART RATE: 73 BPM | WEIGHT: 243.61 LBS | TEMPERATURE: 96.7 F | SYSTOLIC BLOOD PRESSURE: 163 MMHG | BODY MASS INDEX: 44.83 KG/M2 | HEIGHT: 62 IN | OXYGEN SATURATION: 95 %

## 2025-02-17 DIAGNOSIS — M79.671 PAIN IN BOTH FEET: Primary | ICD-10-CM

## 2025-02-17 DIAGNOSIS — M79.602 LEFT ARM PAIN: Primary | ICD-10-CM

## 2025-02-17 DIAGNOSIS — M79.672 PAIN IN BOTH FEET: Primary | ICD-10-CM

## 2025-02-17 PROCEDURE — 99283 EMERGENCY DEPT VISIT LOW MDM: CPT

## 2025-02-17 PROCEDURE — L3010 FOOT LONGITUDINAL ARCH SUPPO: HCPCS | Performed by: PHYSICAL THERAPIST

## 2025-02-17 PROCEDURE — 73030 X-RAY EXAM OF SHOULDER: CPT

## 2025-02-17 PROCEDURE — 99284 EMERGENCY DEPT VISIT MOD MDM: CPT | Performed by: EMERGENCY MEDICINE

## 2025-02-17 RX ORDER — LIDOCAINE 50 MG/G
1 PATCH TOPICAL ONCE
Status: DISCONTINUED | OUTPATIENT
Start: 2025-02-17 | End: 2025-02-17 | Stop reason: HOSPADM

## 2025-02-17 RX ORDER — METHOCARBAMOL 500 MG/1
500 TABLET, FILM COATED ORAL 2 TIMES DAILY
Qty: 20 TABLET | Refills: 0 | Status: SHIPPED | OUTPATIENT
Start: 2025-02-17

## 2025-02-17 RX ORDER — ACETAMINOPHEN 325 MG/1
975 TABLET ORAL ONCE
Status: COMPLETED | OUTPATIENT
Start: 2025-02-17 | End: 2025-02-17

## 2025-02-17 RX ORDER — ACETAMINOPHEN 325 MG/1
650 TABLET ORAL EVERY 6 HOURS PRN
Qty: 30 TABLET | Refills: 0 | Status: SHIPPED | OUTPATIENT
Start: 2025-02-17

## 2025-02-17 RX ORDER — IBUPROFEN 800 MG/1
800 TABLET, FILM COATED ORAL 3 TIMES DAILY
Qty: 21 TABLET | Refills: 0 | Status: SHIPPED | OUTPATIENT
Start: 2025-02-17

## 2025-02-17 RX ORDER — METHOCARBAMOL 500 MG/1
500 TABLET, FILM COATED ORAL ONCE
Status: COMPLETED | OUTPATIENT
Start: 2025-02-17 | End: 2025-02-17

## 2025-02-17 RX ADMIN — METHOCARBAMOL 500 MG: 500 TABLET ORAL at 11:29

## 2025-02-17 RX ADMIN — ACETAMINOPHEN 325MG 975 MG: 325 TABLET ORAL at 11:28

## 2025-02-17 RX ADMIN — LIDOCAINE 5% 1 PATCH: 700 PATCH TOPICAL at 11:27

## 2025-02-17 NOTE — ED PROVIDER NOTES
Spoke to the patient. States she has a clogged duct on her left breast. She is having some pain. She developed a 100.4 fever but it was before developing the clogged duct. Denies any current fever or chills. States she took a warm shower, massaged her breast and the clogged duct did not dissolve. Baby is not able to latch. She tried pumping twice today but only got half an ounce out. Advised she apply a warm compress to the breast and massage the area. Then she should place the baby to the affected breast. If baby cannot latch, should try pumping. Advised she take Ibuprofen 600mg q6hrs for the pain. Lactation consultant phone number provider for further recommendations. Instructed she call back if she develops any fever and/or chills or if symptoms do not improve with our and the lactation consultants recommendations. Pt voiced understanding.    Time reflects when diagnosis was documented in both MDM as applicable and the Disposition within this note       Time User Action Codes Description Comment    2/17/2025 11:14 AM Terrance Adan Sabra [M79.602] Left arm pain           ED Disposition       ED Disposition   Discharge    Condition   Stable    Date/Time   Mon Feb 17, 2025 11:34 AM    Comment   Shanika Flowers discharge to home/self care.                   Assessment & Plan       Medical Decision Making  74-year-old female presents to the emergency department with complaint of left arm pain, differential diagnosis include muscle contusion, musculoskeletal strain, sprain, ligamentous injury, fracture, arthritis, rotator cuff tear, will perform x-ray of left shoulder, and humerus, provide patient with pain medication, and prescription for pain medication, and ambulatory referral to orthopedic surgery for further evaluation.  Strict return precautions given.  Patient understands and agrees with treatment plan.    Amount and/or Complexity of Data Reviewed  Radiology: ordered.    Risk  OTC drugs.  Prescription drug management.        ED Course as of 02/17/25 1516   Mon Feb 17, 2025   1513 IMPRESSION:     Possible recent acromion fracture. Correlation with any joint tenderness near the AC joint is advised.     1515 Discussed results with the patient.  Will provide her with a sling, she will still have conservative management, and follow-up with orthopedic surgery.       Medications   acetaminophen (TYLENOL) tablet 975 mg (975 mg Oral Given 2/17/25 1128)   methocarbamol (ROBAXIN) tablet 500 mg (500 mg Oral Given 2/17/25 1129)       ED Risk Strat Scores                            SBIRT 22yo+      Flowsheet Row Most Recent Value   Initial Alcohol Screen: US AUDIT-C     1. How often do you have a drink containing alcohol? 0 Filed at: 02/17/2025 1129   2. How many drinks containing alcohol do you have on a typical day you are drinking?  0 Filed at: 02/17/2025 1129    3b. FEMALE Any Age, or MALE 65+: How often do you have 4 or more drinks on one occassion? 0 Filed at: 2025   Audit-C Score 0 Filed at: 2025   BAKARI: How many times in the past year have you...    Used an illegal drug or used a prescription medication for non-medical reasons? Never Filed at: 2025 112                            History of Present Illness       Chief Complaint   Patient presents with    Arm Pain     Pt presented to this ED from home c/o left upper arm pain over past week. Pt stated she injured arm falling onto extremity couple months ago. Denies reinjury/trauma. Taking tylenol w/o relief.        Past Medical History:   Diagnosis Date    Arthritis     Asthma     Hypertension       Past Surgical History:   Procedure Laterality Date    BACK SURGERY      HAND FRACTURE REPAIR Right     TONSILLECTOMY        History reviewed. No pertinent family history.   Social History     Tobacco Use    Smoking status: Former     Current packs/day: 0.00     Types: Cigarettes     Quit date: 1990     Years since quittin.8    Smokeless tobacco: Never   Vaping Use    Vaping status: Never Used   Substance Use Topics    Alcohol use: Never    Drug use: Never      E-Cigarette/Vaping    E-Cigarette Use Never User       E-Cigarette/Vaping Substances      I have reviewed and agree with the history as documented.     74-year-old female presents to the emergency department with complaint of left arm pain.  Patient states that in November, she had a fall on a cement floor where she put her left arm in front of her face to make sure that she did not hit her head.  Patient states that over the past couple of months, she has had gradual worsening of her left arm pain.  Patient states that the pain is localized to the lateral aspect of her humerus.  Patient states that the muscle hurts.  On initial evaluation, patient has decreased range of motion due to pain, she does have tenderness to palpation to  the lateral aspect of her humerus.  No ecchymosis, no swelling, no deformity.  Patient is neurovascular intact throughout.  Patient denies any chest pain, or radiation of her pain into her chest.  Patient denies any chills, fevers, GI or  complaints.      Arm Pain  Associated symptoms: myalgias    Associated symptoms: no abdominal pain, no chest pain, no cough, no ear pain, no fever, no rash, no shortness of breath, no sore throat and no vomiting        Review of Systems   Constitutional:  Negative for chills and fever.   HENT:  Negative for ear pain and sore throat.    Eyes:  Negative for pain and visual disturbance.   Respiratory:  Negative for cough and shortness of breath.    Cardiovascular:  Negative for chest pain and palpitations.   Gastrointestinal:  Negative for abdominal pain and vomiting.   Genitourinary:  Negative for dysuria and hematuria.   Musculoskeletal:  Positive for myalgias. Negative for arthralgias and back pain.   Skin:  Negative for color change and rash.   Neurological:  Negative for seizures and syncope.   All other systems reviewed and are negative.          Objective       ED Triage Vitals [02/17/25 1107]   Temperature Pulse Blood Pressure Respirations SpO2 Patient Position - Orthostatic VS   (!) 96.7 °F (35.9 °C) 92 (!) 225/100 18 97 % Lying      Temp src Heart Rate Source BP Location FiO2 (%) Pain Score    -- Monitor Right arm -- 9      Vitals      Date and Time Temp Pulse SpO2 Resp BP Pain Score FACES Pain Rating User   02/17/25 1135 -- 73 95 % 18 163/61 -- -- AC   02/17/25 1128 -- -- -- -- -- 9 --    02/17/25 1107 96.7 °F (35.9 °C) 92 97 % 18 225/100 9 -- AC            Physical Exam  Vitals and nursing note reviewed.   Constitutional:       General: She is not in acute distress.     Appearance: She is well-developed.   HENT:      Head: Normocephalic and atraumatic.   Eyes:      Conjunctiva/sclera: Conjunctivae normal.   Cardiovascular:      Rate and Rhythm: Normal rate and regular  rhythm.      Heart sounds: No murmur heard.  Pulmonary:      Effort: Pulmonary effort is normal. No respiratory distress.      Breath sounds: Normal breath sounds.   Abdominal:      Palpations: Abdomen is soft.      Tenderness: There is no abdominal tenderness.   Musculoskeletal:         General: Tenderness present. No swelling.      Cervical back: Neck supple.   Skin:     General: Skin is warm and dry.      Capillary Refill: Capillary refill takes less than 2 seconds.   Neurological:      Mental Status: She is alert.   Psychiatric:         Mood and Affect: Mood normal.         Results Reviewed       None            XR shoulder 2+ views LEFT   Final Interpretation by Juan Gonzalez MD (02/17 1340)      Possible recent acromion fracture. Correlation with any joint tenderness near the AC joint is advised.         Computerized Assisted Algorithm (CAA) may have been used to analyze all applicable images.      The study was marked in EPIC for immediate notification.         Resident: Nancy June I, the attending radiologist, have reviewed the images and agree with the final report above.      Workstation performed: [id number]             Procedures    ED Medication and Procedure Management   Prior to Admission Medications   Prescriptions Last Dose Informant Patient Reported? Taking?   Multiple Vitamin (multivitamin) capsule   Yes No   Sig: Take 1 capsule by mouth daily   calcium carbonate (OS-KACIE) 600 MG tablet   Yes No   Sig: Take 600 mg by mouth 2 (two) times a day with meals   gabapentin (NEURONTIN) 300 mg capsule   No No   Sig: Take 1 capsule (300 mg total) by mouth 3 (three) times a day   metoprolol succinate (TOPROL-XL) 50 mg 24 hr tablet   Yes No   Sig: Take 50 mg by mouth daily   nystatin (MYCOSTATIN) cream   Yes No   Sig: Apply topically as needed   potassium chloride (Klor-Con M20) 20 mEq tablet   Yes No   Sig: Take 20 mEq by mouth   simvastatin (ZOCOR) 20 mg tablet   Yes No   Sig: Take 20 mg by mouth  daily at bedtime   triamterene-hydrochlorothiazide (DYAZIDE) 37.5-25 mg per capsule   Yes No   Sig: Take 1 capsule by mouth every morning      Facility-Administered Medications: None     Discharge Medication List as of 2/17/2025 11:34 AM        START taking these medications    Details   acetaminophen (TYLENOL) 325 mg tablet Take 2 tablets (650 mg total) by mouth every 6 (six) hours as needed for mild pain, Starting Mon 2/17/2025, Normal      ibuprofen (MOTRIN) 800 mg tablet Take 1 tablet (800 mg total) by mouth 3 (three) times a day, Starting Mon 2/17/2025, Normal      methocarbamol (ROBAXIN) 500 mg tablet Take 1 tablet (500 mg total) by mouth 2 (two) times a day, Starting Mon 2/17/2025, Normal           CONTINUE these medications which have NOT CHANGED    Details   calcium carbonate (OS-KACIE) 600 MG tablet Take 600 mg by mouth 2 (two) times a day with meals, Historical Med      gabapentin (NEURONTIN) 300 mg capsule Take 1 capsule (300 mg total) by mouth 3 (three) times a day, Starting Wed 2/5/2025, Normal      metoprolol succinate (TOPROL-XL) 50 mg 24 hr tablet Take 50 mg by mouth daily, Historical Med      Multiple Vitamin (multivitamin) capsule Take 1 capsule by mouth daily, Historical Med      nystatin (MYCOSTATIN) cream Apply topically as needed, Starting Fri 1/3/2025, Historical Med      potassium chloride (Klor-Con M20) 20 mEq tablet Take 20 mEq by mouth, Historical Med      simvastatin (ZOCOR) 20 mg tablet Take 20 mg by mouth daily at bedtime, Historical Med      triamterene-hydrochlorothiazide (DYAZIDE) 37.5-25 mg per capsule Take 1 capsule by mouth every morning, Historical Med             ED SEPSIS DOCUMENTATION   Time reflects when diagnosis was documented in both MDM as applicable and the Disposition within this note       Time User Action Codes Description Comment    2/17/2025 11:14 AM Terrance Adan [M79.602] Left arm pain                  Terrance Adan, DO  02/17/25 1516

## 2025-02-17 NOTE — DISCHARGE INSTRUCTIONS
You were seen in the emergency department for arm pain, your x-ray shows no fracture, we provided you with follow-up to orthopedic surgery, please call their office and schedule an appointment.  We have also provided you with prescriptions for Tylenol, Motrin, and Robaxin a muscle relaxer.  Please pick them up at the pharmacy and take them as directed.  If your symptoms worsen or persist, please return to the emergency department immediately.

## 2025-02-21 ENCOUNTER — OFFICE VISIT (OUTPATIENT)
Dept: OBGYN CLINIC | Facility: CLINIC | Age: 75
End: 2025-02-21
Payer: COMMERCIAL

## 2025-02-21 VITALS — WEIGHT: 243 LBS | HEIGHT: 62 IN | BODY MASS INDEX: 44.72 KG/M2

## 2025-02-21 DIAGNOSIS — M25.512 CHRONIC LEFT SHOULDER PAIN: ICD-10-CM

## 2025-02-21 DIAGNOSIS — M75.82 TENDINITIS OF LEFT ROTATOR CUFF: Primary | ICD-10-CM

## 2025-02-21 DIAGNOSIS — G89.29 CHRONIC LEFT SHOULDER PAIN: ICD-10-CM

## 2025-02-21 PROCEDURE — 99204 OFFICE O/P NEW MOD 45 MIN: CPT | Performed by: ORTHOPAEDIC SURGERY

## 2025-02-21 RX ORDER — CELECOXIB 100 MG/1
100 CAPSULE ORAL 2 TIMES DAILY
Qty: 60 CAPSULE | Refills: 1 | Status: SHIPPED | OUTPATIENT
Start: 2025-02-21

## 2025-02-21 NOTE — PROGRESS NOTES
Assessment/Plan:  Assessment & Plan   Diagnoses and all orders for this visit:    Tendinitis of left rotator cuff  -     Ambulatory referral to Physical Therapy; Future  -     celecoxib (CeleBREX) 100 mg capsule; Take 1 capsule (100 mg total) by mouth 2 (two) times a day    Chronic left shoulder pain  -     Ambulatory referral to Physical Therapy; Future  -     celecoxib (CeleBREX) 100 mg capsule; Take 1 capsule (100 mg total) by mouth 2 (two) times a day    Plan: Treatment was discussed.  She elected to proceed with subacromial injection which was performed without difficulty.  I have written for physical therapy.  I have switched her to Celebrex for the convenience of twice daily dosing instead of 3 times daily dosing.  She may discontinue the methocarbamol.  I will see her back in 2 to 3 weeks for reevaluation.  She is to contact me if questions or concerns arise in the interim.      Subjective:   Patient ID: Shanika Flowers is a 74 y.o. female.    NATALIA Voss is a 74-year-old right-hand-dominant female with a 3-month history of left shoulder pain.  She recalls falling at a pharmacy onto a concrete floor placing her left arm out in front of her to stop her fall and experiencing intermittent left shoulder pain since the fall.  Symptoms worsened about a week ago and she presented to the emergency room at [facility] within the last couple of days, x-rays were obtained and she was provided with prescriptions for methocarbamol and ibuprofen.  She notes improvement in symptoms for a couple of hours after taking the medication but no prolonged improvement.  She presents now for orthopedic evaluation and treatment.  She complains of pain from the shoulder radiating distally to the proximal forearm.  She denies left upper extremity paresthesias.  She denies pain at rest but notes pain with any activity.  She does tend to note improvement if she rests her arm across her chest as if it were in a sling.  She  also notes improvement with placing her hand behind her head.  She has some mild pain radiating toward the left trapezius and paraspinal musculature.  She denies any midline neck pain or right sided neck pain.  She denies any right upper extremity complaints.  She has had no specific treatment other than the medication provided by the emergency room.    The following portions of the patient's history were reviewed and updated as appropriate: allergies, current medications, past family history, past medical history, past social history, past surgical history, and problem list.    Review of Systems: The 10 organ system review of systems is negative excluding the chief complaint and as it is consistent with past medical history.    Objective:  Ortho Exam  The left shoulder exam demonstrates abduction to about 110 degrees with complaints of pain over the deltoid.  She has forward flexion of 160 degrees, equal to the contralateral right side, with complaints of mild pain.  She has 60 degrees of external rotation with her elbow out of plaints of mild left shoulder pain.  She has 4+/5 strength of external rotation, 4/5 strength of internal rotation and 5/5 strength of abduction.  She denies pain with abduction but does complain of pain with rotation.  She has no tenderness over the AC joint, the acromion or the clavicle.  The rotator cuff insertion is nontender and the biceps tendon is nontender.  She does have some tenderness at the deltoid insertion.  Abdominal compression and liftoff test were negative.  Jobes and [facility] [facility] test elicited complaints of pain over the superior and lateral shoulder.  She has good passive glenohumeral motion with minimal pain.  The remainder of the left upper extremity exam is benign.    Cervical spine exam demonstrates good range of motion without complaints.  She did complain of some mild left shoulder pain with Spurling's maneuver.  She denied symptoms with Spurling maneuvers to the  right.  Her mead sign is negative.  She has no midline cervical spine tenderness.  Paraspinal muscles are nontender.  She does have mild pain with palpation of the trapezius.  Periscapular muscles are nontender.  Physical Exam    X-rays of the shoulder obtained in the emergency room demonstrate degenerative changes about the AC joint and mild glenohumeral degenerative changes.  There is no evidence of acute injury.  I do not believe there is fracture of the acromion as indicated as a possibility in the x-ray report    The x-ray report was reviewed.    The ER note was reviewed.

## 2025-02-28 ENCOUNTER — EVALUATION (OUTPATIENT)
Dept: PHYSICAL THERAPY | Facility: CLINIC | Age: 75
End: 2025-02-28
Payer: COMMERCIAL

## 2025-02-28 DIAGNOSIS — M75.82 TENDINITIS OF LEFT ROTATOR CUFF: ICD-10-CM

## 2025-02-28 DIAGNOSIS — G89.29 CHRONIC LEFT SHOULDER PAIN: ICD-10-CM

## 2025-02-28 DIAGNOSIS — M25.512 CHRONIC LEFT SHOULDER PAIN: ICD-10-CM

## 2025-02-28 PROCEDURE — 97161 PT EVAL LOW COMPLEX 20 MIN: CPT | Performed by: PHYSICAL THERAPIST

## 2025-02-28 NOTE — PROGRESS NOTES
PT Evaluation     Today's date: 2025  Patient name: Shanika Flowers  : 1950  MRN: [id number]  Referring provider: Moises Allen DO  Dx:   Encounter Diagnosis     ICD-10-CM    1. Tendinitis of left rotator cuff  M75.82 Ambulatory referral to Physical Therapy     PT plan of care cert/re-cert      2. Chronic left shoulder pain  M25.512 Ambulatory referral to Physical Therapy    G89.29 PT plan of care cert/re-cert          Start Time: 1000  Stop Time: 1035  Total time in clinic (min): 35 minutes    Assessment  Impairments: abnormal or restricted ROM, activity intolerance, impaired physical strength, lacks appropriate home exercise program, pain with function, poor posture , unable to perform ADL, activity limitations and endurance    Assessment details: Pt is a 74 year old female who presents to OP PT for tendinitis of L shoulder. Upon examination, patient presents with decreased L shoulder ROM, decreased L shoulder strength and tenderness throughout RTC. Due to her current impairments patient has difficulty with ADLs, reaching and lifting. Pt would benefit from OP PT services in order to address current impairments and functional limitations. Thank you for your referral!       Goals  STG (to be met within 4 weeks):  1. Pt will improve pain in L shoulder at worst to no greater than 4/10 in order to improve tolerance to OH movements  2. Pt will improve L shoulder ROM by at least 5* in order to improve reaching  3. Pt will improve L shoulder strength by at least 1/2 grade in order to progress to PLOF  4. Pt will be independent with HEP  5. Pt will improve FOTO score by at least 10 points in order to progress to PLOF and improve QOL    LTG (to be met within 8 weeks):  1. Pt will be able to perform all activities pain free in order to maximize independence  2. Pt will restore WFL L shoulder ROM in order to participate in recreational activities  3. Pt will restore WFL L shoulder strength in order to  return to PLOF  4. Pt to meet FOTO discharge score in order to improve QOL       Plan  Patient would benefit from: skilled physical therapy  Planned modality interventions: thermotherapy: hydrocollator packs and cryotherapy    Planned therapy interventions: joint mobilization, manual therapy, neuromuscular re-education, patient education, postural training, strengthening, therapeutic exercise, stretching and home exercise program    Frequency: 1x week  Duration in weeks: 6  Plan details: PT discussed goals and expectations of OPPT; pt understanding of POC and HEP provided.      Subjective Evaluation    History of Present Illness  Mechanism of injury: Patient reports falling in November and reaching out with L arm to brace herself. Couple days afterward, she had intermittent pain. The last 2 weeks it has really flared up. Seen in ED, given ms relaxer and ibuprofen. Told had possible fx, but unlikely. Unable to take Celerbex due to intense itching. Taking rx ibuprofen. Given a cortisone shot, notes 55-60% improvement. Certain ADLs like dressing and lifting it difficult.   Patient Goals  Patient goals for therapy: increased strength, independence with ADLs/IADLs, decreased pain and increased motion    Pain  Current pain ratin  At best pain ratin  At worst pain ratin  Location: Upper arm  Quality: dull ache        Objective     Postural Observations  Seated posture: fair  Standing posture: fair      Tenderness     Left Shoulder   Tenderness in the infraspinatus tendon and supraspinatus tendon.     Neurological Testing     Sensation     Shoulder   Left Shoulder   Intact: light touch    Right Shoulder   Intact: Light touch    Active Range of Motion   Left Shoulder   Flexion: WFL  Abduction: WFL  External rotation BTH: C7     Right Shoulder   External rotation BTH: T2     Strength/Myotome Testing     Left Shoulder     Planes of Motion   Flexion: 4-   Abduction: 4-   External rotation at 0°: 4-     Right  Shoulder     Planes of Motion   Flexion: 4   Abduction: 4   External rotation at 0°: 4       Flowsheet Rows      Flowsheet Row Most Recent Value   PT/OT G-Codes    Current Score 58   Projected Score 66               Precautions:   Patient Active Problem List   Diagnosis    Numbness of toes    Arthritis of left foot    Pain in both feet    Obesity, morbid (HCC)    Tendinitis of left rotator cuff    Chronic left shoulder pain     POC Expiration: 3/28/25  Manuals        Shoulder PROM        GH Joint Mobs                        Neuro Re-Ed        Wall slide IYT        SA at wall with res&FR        Wall ball CW/CCW        Seated shoulder press                                                        TherEx        UBE to improve ROM/cardiovasc endurance        Retractions @ wall        S/L (flexion, horiz abd, ER)        MTP/ LTP        UT stretch                                Instructed HEP & education 10'                                       Modalities

## 2025-03-07 ENCOUNTER — OFFICE VISIT (OUTPATIENT)
Dept: PHYSICAL THERAPY | Facility: CLINIC | Age: 75
End: 2025-03-07
Payer: COMMERCIAL

## 2025-03-07 DIAGNOSIS — G89.29 CHRONIC LEFT SHOULDER PAIN: ICD-10-CM

## 2025-03-07 DIAGNOSIS — M47.26 OTHER SPONDYLOSIS WITH RADICULOPATHY, LUMBAR REGION: ICD-10-CM

## 2025-03-07 DIAGNOSIS — M25.512 CHRONIC LEFT SHOULDER PAIN: ICD-10-CM

## 2025-03-07 DIAGNOSIS — M75.82 TENDINITIS OF LEFT ROTATOR CUFF: Primary | ICD-10-CM

## 2025-03-07 DIAGNOSIS — M96.1 POSTLAMINECTOMY SYNDROME OF LUMBAR REGION: ICD-10-CM

## 2025-03-07 PROCEDURE — 97110 THERAPEUTIC EXERCISES: CPT

## 2025-03-07 PROCEDURE — 97140 MANUAL THERAPY 1/> REGIONS: CPT

## 2025-03-07 PROCEDURE — 97112 NEUROMUSCULAR REEDUCATION: CPT

## 2025-03-07 NOTE — PROGRESS NOTES
"Daily Note     Today's date: 3/7/2025  Patient name: Shanika Flowers  : 1950  MRN: [id number]  Referring provider: Moises Allen DO  Dx:   Encounter Diagnosis     ICD-10-CM    1. Tendinitis of left rotator cuff  M75.82       2. Chronic left shoulder pain  M25.512     G89.29                      Subjective: Patient reports that she is doing well this morning.  Patient reports that she has been feeling good since her IE.  Patient reports that she is compliant with her home program.      Objective: See treatment diary below      Assessment: Tolerated treatment well. Patient exhibited good technique with therapeutic exercises and would benefit from continued PT to increase L shoulder ROM/strength and endurance to improve her mobility.  Patient demonstrates a good tolerance to her therapeutic ex c fatigue post.  Patient reports that L shoulder felt good post session.      Plan: Continue per plan of care.      Precautions:   Patient Active Problem List   Diagnosis    Numbness of toes    Arthritis of left foot    Pain in both feet    Obesity, morbid (HCC)    Tendinitis of left rotator cuff    Chronic left shoulder pain     POC Expiration: 3/28/25  Manuals  3      Shoulder PROM  10'      GH Joint Mobs  5'                      Neuro Re-Ed        Wall slide IYT  10xea IYTA      SA at wall with res&FR  10x5\"      Wall ball CW/CCW        Seated shoulder press                                                        TherEx        UBE to improve ROM/cardiovasc endurance  10' 1.0alt      Retractions @ wall  10x5\"      S/L (flexion, horiz abd, ER)  10xea      MTP/ LTP        UT stretch  5x15\"bilat                              Instructed HEP & education 10'                                       Modalities                      "

## 2025-03-13 ENCOUNTER — OFFICE VISIT (OUTPATIENT)
Dept: PHYSICAL THERAPY | Facility: CLINIC | Age: 75
End: 2025-03-13
Payer: COMMERCIAL

## 2025-03-13 DIAGNOSIS — G89.29 CHRONIC LEFT SHOULDER PAIN: ICD-10-CM

## 2025-03-13 DIAGNOSIS — M25.512 CHRONIC LEFT SHOULDER PAIN: ICD-10-CM

## 2025-03-13 DIAGNOSIS — M75.82 TENDINITIS OF LEFT ROTATOR CUFF: Primary | ICD-10-CM

## 2025-03-13 PROCEDURE — 97110 THERAPEUTIC EXERCISES: CPT

## 2025-03-13 PROCEDURE — 97112 NEUROMUSCULAR REEDUCATION: CPT

## 2025-03-13 PROCEDURE — 97140 MANUAL THERAPY 1/> REGIONS: CPT

## 2025-03-14 ENCOUNTER — OFFICE VISIT (OUTPATIENT)
Dept: OBGYN CLINIC | Facility: CLINIC | Age: 75
End: 2025-03-14
Payer: COMMERCIAL

## 2025-03-14 VITALS — WEIGHT: 240.3 LBS | HEIGHT: 62 IN | BODY MASS INDEX: 44.22 KG/M2

## 2025-03-14 DIAGNOSIS — M75.82 TENDINITIS OF LEFT ROTATOR CUFF: Primary | ICD-10-CM

## 2025-03-14 PROCEDURE — 99213 OFFICE O/P EST LOW 20 MIN: CPT | Performed by: ORTHOPAEDIC SURGERY

## 2025-03-14 NOTE — PROGRESS NOTES
Name: Shanika Flowers      : 1950      MRN: [id number]  Encounter Provider: Moises Allen DO  Encounter Date: 3/14/2025   Encounter department: [facility] [place]  :  Assessment & Plan  Tendinitis of left rotator cuff  Patient will continue in physical therapy for another 2 to 3 weeks maximum based on progress.  Follow-up in the orthopedic office in 6 weeks.  Patient should remain diligent with her home exercise program range of motion.  Continue to ice use over-the-counter NSAID.           History of Present Illness   HPI  Shanika Flowers is a 74 y.o. female who presents for follow-up of left shoulder rotator cuff tendinitis post CSI 2025.  She has been to physical therapy 3 times and notes improvement by physical therapy notes.  Patient notes good improvement in the shoulder.  Patient notes that she developed itching from taking the Celebrex and had to stop the medication.  She is not using any medications at current.  Denies gross pain numbness or tingling.  She did get slight facial flushing from the CSI she normally gets when she gets cortisone injections in her heels.    Review of Systems  Current Outpatient Medications on File Prior to Visit   Medication Sig Dispense Refill    acetaminophen (TYLENOL) 325 mg tablet Take 2 tablets (650 mg total) by mouth every 6 (six) hours as needed for mild pain 30 tablet 0    calcium carbonate (OS-KACIE) 600 MG tablet Take 600 mg by mouth 2 (two) times a day with meals      metoprolol succinate (TOPROL-XL) 50 mg 24 hr tablet Take 50 mg by mouth daily      Multiple Vitamin (multivitamin) capsule Take 1 capsule by mouth daily      nystatin (MYCOSTATIN) cream Apply topically as needed      potassium chloride (Klor-Con M20) 20 mEq tablet Take 20 mEq by mouth      simvastatin (ZOCOR) 20 mg tablet Take 20 mg by mouth daily at bedtime      triamterene-hydrochlorothiazide (DYAZIDE) 37.5-25 mg per capsule Take 1 capsule by mouth every  "morning      celecoxib (CeleBREX) 100 mg capsule Take 1 capsule (100 mg total) by mouth 2 (two) times a day (Patient not taking: Reported on 3/14/2025) 60 capsule 1    gabapentin (NEURONTIN) 300 mg capsule Take 1 capsule (300 mg total) by mouth 3 (three) times a day (Patient not taking: Reported on 3/14/2025) 90 capsule 2    ibuprofen (MOTRIN) 800 mg tablet Take 1 tablet (800 mg total) by mouth 3 (three) times a day (Patient not taking: Reported on 3/14/2025) 21 tablet 0    methocarbamol (ROBAXIN) 500 mg tablet Take 1 tablet (500 mg total) by mouth 2 (two) times a day (Patient not taking: Reported on 3/14/2025) 20 tablet 0     No current facility-administered medications on file prior to visit.      Social History     Tobacco Use    Smoking status: Former     Current packs/day: 0.00     Types: Cigarettes     Quit date: 1990     Years since quittin.9    Smokeless tobacco: Never   Vaping Use    Vaping status: Never Used   Substance and Sexual Activity    Alcohol use: Never    Drug use: Never    Sexual activity: Not on file      Objective   Ht 5' 2\" (1.575 m)   Wt 109 kg (240 lb 4.8 oz)   BMI 43.95 kg/m²      Physical Exam    Left shoulder is with grossly full symmetric range of motion.  There is slight tightness getting to full external rotation when the shoulder is abducted.  No crepitation with passive range of motion.  Light touch sensation intact throughout.  Jobes strength 5/5.  External rotation strength with the arm adducted at the side is 4+/5.    PT notes reviewed.  "

## 2025-03-14 NOTE — PATIENT INSTRUCTIONS
Patient will continue in physical therapy for another 2 to 3 weeks maximum based on progress.  Follow-up in the orthopedic office in 6 weeks.  Patient should remain diligent with her home exercise program range of motion.  Continue to ice use over-the-counter NSAID.

## 2025-03-20 ENCOUNTER — OFFICE VISIT (OUTPATIENT)
Dept: PHYSICAL THERAPY | Facility: CLINIC | Age: 75
End: 2025-03-20
Payer: COMMERCIAL

## 2025-03-20 DIAGNOSIS — M25.512 CHRONIC LEFT SHOULDER PAIN: ICD-10-CM

## 2025-03-20 DIAGNOSIS — G89.29 CHRONIC LEFT SHOULDER PAIN: ICD-10-CM

## 2025-03-20 DIAGNOSIS — M75.82 TENDINITIS OF LEFT ROTATOR CUFF: Primary | ICD-10-CM

## 2025-03-20 PROCEDURE — 97112 NEUROMUSCULAR REEDUCATION: CPT

## 2025-03-20 PROCEDURE — 97110 THERAPEUTIC EXERCISES: CPT

## 2025-03-20 PROCEDURE — 97140 MANUAL THERAPY 1/> REGIONS: CPT

## 2025-03-20 NOTE — PROGRESS NOTES
"Daily Note     Today's date: 3/20/2025  Patient name: Shanika Flowers  : 1950  MRN: [id number]  Referring provider: Moises Allen DO  Dx:   Encounter Diagnosis     ICD-10-CM    1. Tendinitis of left rotator cuff  M75.82       2. Chronic left shoulder pain  M25.512     G89.29                      Subjective: Patient states she had a follow up with her orthopedic doctor and they pleased with her progress so far. They would like her to be done PT in 2-3 weeks.       Objective: See treatment diary below      Assessment: Patient tolerated treatment well with continued steady progress towards functional strength and mobility goals. Patient exhibited good technique with therapeutic exercises and would benefit from continued skilled PT services to address ongoing impairments.        Plan: Continue per plan of care.      Precautions:   Patient Active Problem List   Diagnosis    Numbness of toes    Arthritis of left foot    Pain in both feet    Obesity, morbid (HCC)    Tendinitis of left rotator cuff    Chronic left shoulder pain     POC Expiration: 3/28/25  Manuals  3/7 3/13 3/20    Shoulder PROM  10' 10' 10'    GH Joint Mobs  5' 5' 5'                    Neuro Re-Ed        Wall slide IYT  10xea IYTA 10x ea IYTA 10x ea IYTA    SA at wall with res&FR  10x5\" 10x5\" 10x5\"    Wall ball CW/CCW        Seated shoulder press                                                        TherEx        UBE to improve ROM/cardiovasc endurance  10' 1.0alt 10' 1.0 alt 10' 1.0 alt    Retractions @ wall  10x5\" 10x5\" 10x5\"    S/L (flexion, horiz abd, ER)  10xea 10xea 2x10 ea    MTP/ LTP        UT stretch  5x15\"bilat  5x15\" bilat                            Instructed HEP & education 10'                                       Modalities                          "

## 2025-03-27 ENCOUNTER — EVALUATION (OUTPATIENT)
Dept: PHYSICAL THERAPY | Facility: CLINIC | Age: 75
End: 2025-03-27
Payer: COMMERCIAL

## 2025-03-27 DIAGNOSIS — M75.82 TENDINITIS OF LEFT ROTATOR CUFF: Primary | ICD-10-CM

## 2025-03-27 DIAGNOSIS — M25.512 CHRONIC LEFT SHOULDER PAIN: ICD-10-CM

## 2025-03-27 DIAGNOSIS — G89.29 CHRONIC LEFT SHOULDER PAIN: ICD-10-CM

## 2025-03-27 PROCEDURE — 97535 SELF CARE MNGMENT TRAINING: CPT

## 2025-03-27 PROCEDURE — 97140 MANUAL THERAPY 1/> REGIONS: CPT | Performed by: PHYSICAL THERAPIST

## 2025-03-27 PROCEDURE — 97140 MANUAL THERAPY 1/> REGIONS: CPT

## 2025-03-27 NOTE — PROGRESS NOTES
PT Re-Evaluation  and PT Discharge    Today's date: 3/27/2025  Patient name: Shanika Flowers  : 1950  MRN: [id number]  Referring provider: Moises Allen DO  Dx:   Encounter Diagnosis     ICD-10-CM    1. Tendinitis of left rotator cuff  M75.82       2. Chronic left shoulder pain  M25.512     G89.29                      Assessment    Assessment details: Pt has attended a total of 5 PT sessions and has made adequate progress towards goals to be d/c from PT services. PT reviewed and instructed HEP (handout provided) along with answering pt questions regarding current functional status. Pt has no concerns at time of discharge. Thank you!          Goals  STG (to be met within 4 weeks):  1. Pt will improve pain in L shoulder at worst to no greater than 4/10 in order to improve tolerance to OH movements  met  2. Pt will improve L shoulder ROM by at least 5* in order to improve reaching  met  3. Pt will improve L shoulder strength by at least 1/2 grade in order to progress to PLOF  met  4. Pt will be independent with HEP  met  5. Pt will improve FOTO score by at least 10 points in order to progress to PLOF and improve QOL  met    LTG (to be met within 8 weeks):  1. Pt will be able to perform all activities pain free in order to maximize independence  met  2. Pt will restore WFL L shoulder ROM in order to participate in recreational activities  met  3. Pt will restore WFL L shoulder strength in order to return to PLOF  met  4. Pt to meet FOTO discharge score in order to improve QOL   met         Subjective Evaluation    History of Present Illness  Mechanism of injury: Patient notes significant improvements in sx's since SOC. Prepared to transitino to HEP  Pain  No pain reported  Current pain ratin  At best pain ratin  At worst pain ratin        Objective     Postural Observations  Seated posture: fair  Standing posture: fair      Tenderness     Left Shoulder   No tenderness in the coracoid process,  infraspinatus tendon and supraspinatus tendon.     Neurological Testing     Sensation     Shoulder   Left Shoulder   Intact: light touch    Right Shoulder   Intact: Light touch    Active Range of Motion   Left Shoulder   Flexion: WFL  Abduction: WFL  External rotation BTH: T2   Internal rotation BTB: L4     Right Shoulder   Flexion: WFL  Abduction: WFL  External rotation BTH: T2   Internal rotation BTB: L4     Strength/Myotome Testing     Left Shoulder     Planes of Motion   Flexion: 4   Abduction: 4   External rotation at 0°: 4     Right Shoulder     Planes of Motion   Flexion: 4   Abduction: 4   External rotation at 0°: 4                Precautions:   Patient Active Problem List   Diagnosis    Numbness of toes    Arthritis of left foot    Pain in both feet    Obesity, morbid (HCC)    Tendinitis of left rotator cuff    Chronic left shoulder pain     POC Expiration: 4/27/25

## 2025-03-27 NOTE — PROGRESS NOTES
"Daily Note     Today's date: 3/27/2025  Patient name: Shanika Flowers  : 1950  MRN: [id number]  Referring provider: Moises Allen DO  Dx:   Encounter Diagnosis     ICD-10-CM    1. Tendinitis of left rotator cuff  M75.82       2. Chronic left shoulder pain  M25.512     G89.29                      Subjective: Patient reports that she is pleased with the progress she's made in OPPT.  Patient reports that she can do most everyday activities with little to no difficulty.      Objective: See treatment diary below      Assessment: Tolerated treatment well. Patient to transition from skilled rehab to HEP today.  Patient educated on and demonstrated a good understanding of her home program.  Patient compliance with her HEP will assist patient in maintaining gains made in OPPT.      Plan: Patient discharged to HEP at this time.     Precautions:   Patient Active Problem List   Diagnosis    Numbness of toes    Arthritis of left foot    Pain in both feet    Obesity, morbid (HCC)    Tendinitis of left rotator cuff    Chronic left shoulder pain     POC Expiration: 3/28/25  Manuals  3/7 3/13 3/20 3/27   Shoulder PROM  10' 10' 10' 10'   GH Joint Mobs  5' 5' 5' 5'                   Neuro Re-Ed        Wall slide IYT  10xea IYTA 10x ea IYTA 10x ea IYTA    SA at wall with res&FR  10x5\" 10x5\" 10x5\"    Wall ball CW/CCW        Seated shoulder press                                                        TherEx        UBE to improve ROM/cardiovasc endurance  10' 1.0alt 10' 1.0 alt 10' 1.0 alt 10' 1.0alt   Retractions @ wall  10x5\" 10x5\" 10x5\"    S/L (flexion, horiz abd, ER)  10xea 10xea 2x10 ea    MTP/ LTP        UT stretch  5x15\"bilat  5x15\" bilat                            Instructed HEP & education 10    20'                                   Modalities                            "

## 2025-04-25 ENCOUNTER — OFFICE VISIT (OUTPATIENT)
Dept: OBGYN CLINIC | Facility: CLINIC | Age: 75
End: 2025-04-25
Payer: COMMERCIAL

## 2025-04-25 VITALS — WEIGHT: 239.8 LBS | HEIGHT: 62 IN | BODY MASS INDEX: 44.13 KG/M2

## 2025-04-25 DIAGNOSIS — M17.11 PRIMARY OSTEOARTHRITIS OF RIGHT KNEE: ICD-10-CM

## 2025-04-25 DIAGNOSIS — M75.82 TENDINITIS OF LEFT ROTATOR CUFF: Primary | ICD-10-CM

## 2025-04-25 PROCEDURE — 99213 OFFICE O/P EST LOW 20 MIN: CPT | Performed by: ORTHOPAEDIC SURGERY

## 2025-04-25 PROCEDURE — 20610 DRAIN/INJ JOINT/BURSA W/O US: CPT | Performed by: PHYSICIAN ASSISTANT

## 2025-04-25 RX ORDER — BUPIVACAINE HYDROCHLORIDE 2.5 MG/ML
4 INJECTION, SOLUTION INFILTRATION; PERINEURAL
Status: COMPLETED | OUTPATIENT
Start: 2025-04-25 | End: 2025-04-25

## 2025-04-25 RX ORDER — TRIAMCINOLONE ACETONIDE 40 MG/ML
40 INJECTION, SUSPENSION INTRA-ARTICULAR; INTRAMUSCULAR
Status: COMPLETED | OUTPATIENT
Start: 2025-04-25 | End: 2025-04-25

## 2025-04-25 RX ADMIN — BUPIVACAINE HYDROCHLORIDE 4 ML: 2.5 INJECTION, SOLUTION INFILTRATION; PERINEURAL at 09:15

## 2025-04-25 RX ADMIN — TRIAMCINOLONE ACETONIDE 40 MG: 40 INJECTION, SUSPENSION INTRA-ARTICULAR; INTRAMUSCULAR at 09:15

## 2025-04-25 NOTE — PATIENT INSTRUCTIONS
Patient encouraged to continue doing her on exercise program and use ice or Tylenol as needed for any discomfort.  Can be seen back on an as-needed basis.  All activities as tolerated.

## 2025-04-25 NOTE — ASSESSMENT & PLAN NOTE
Continue doing home exercise program.  Use ice and Tylenol as needed for discomfort.  Patient will be seen back on an as-needed basis only.  All activities as tolerated.

## 2025-04-25 NOTE — PROGRESS NOTES
Name: Shanika Flowers      : 1950      MRN: [id number]  Encounter Provider: Moises Allen DO  Encounter Date: 2025   Encounter department: [facility] [place]  :  Assessment & Plan  Tendinitis of left rotator cuff  Continue doing home exercise program.  Use ice and Tylenol as needed for discomfort.  Patient will be seen back on an as-needed basis only.  All activities as tolerated.       Primary osteoarthritis of right knee  Elected CSI today.  Follow up prn. Recommend weight loss.  Get new x-rays right knee upon any return to clinic.       History of Present Illness   HPI  Shanika Flowers is a 74 y.o. female who presents shoulder rotator cuff tendinitis status post CSI 2025.  She denies any significant pain in the shoulder.  She is had rare discomfort in the shoulder after doing lots of yard work that resolved with rest and Tylenol.  She continues to do her home exercises.  She also has a history of bone-on-bone right knee arthritis and received numerous cortisone injections in the past from Dr. Downing and [place].  She is transitioning from the [facility] to the [facility].  She is requesting repeat CSI noting it has been at least 3 months since her previous knee injection.  X-ray reports were reviewed.    Review of Systems  Current Outpatient Medications on File Prior to Visit   Medication Sig Dispense Refill    acetaminophen (TYLENOL) 325 mg tablet Take 2 tablets (650 mg total) by mouth every 6 (six) hours as needed for mild pain 30 tablet 0    calcium carbonate (OS-KACIE) 600 MG tablet Take 600 mg by mouth 2 (two) times a day with meals      metoprolol succinate (TOPROL-XL) 50 mg 24 hr tablet Take 50 mg by mouth daily      Multiple Vitamin (multivitamin) capsule Take 1 capsule by mouth daily      nystatin (MYCOSTATIN) cream Apply topically as needed      potassium chloride (Klor-Con M20) 20 mEq tablet Take 20 mEq by mouth       "simvastatin (ZOCOR) 20 mg tablet Take 20 mg by mouth daily at bedtime      triamterene-hydrochlorothiazide (DYAZIDE) 37.5-25 mg per capsule Take 1 capsule by mouth every morning      celecoxib (CeleBREX) 100 mg capsule Take 1 capsule (100 mg total) by mouth 2 (two) times a day (Patient not taking: Reported on 2025) 60 capsule 1    gabapentin (NEURONTIN) 300 mg capsule Take 1 capsule (300 mg total) by mouth 3 (three) times a day (Patient not taking: Reported on 2025) 90 capsule 2    ibuprofen (MOTRIN) 800 mg tablet Take 1 tablet (800 mg total) by mouth 3 (three) times a day (Patient not taking: Reported on 2025) 21 tablet 0    methocarbamol (ROBAXIN) 500 mg tablet Take 1 tablet (500 mg total) by mouth 2 (two) times a day (Patient not taking: Reported on 2025) 20 tablet 0     No current facility-administered medications on file prior to visit.      Social History     Tobacco Use    Smoking status: Former     Current packs/day: 0.00     Types: Cigarettes     Quit date: 1990     Years since quittin.0    Smokeless tobacco: Never   Vaping Use    Vaping status: Never Used   Substance and Sexual Activity    Alcohol use: Never    Drug use: Never    Sexual activity: Not on file        Objective   Ht 5' 2\" (1.575 m)   Wt 109 kg (239 lb 12.8 oz)   BMI 43.86 kg/m²      Physical Exam    Left shoulder notes grossly full range of motion without pain.  She has some mild impingement test but negative cross chest impingement.  She has no gross rotator cuff weakness.  Light touch sensation intact.      Right knee with medial joint tenderness.  There is no erythema warmth or signs of infection.  She has positive patellofemoral crepitation.    Large joint arthrocentesis: R knee  Universal Protocol:  Consent: Verbal consent obtained.  Risks and benefits: risks, benefits and alternatives were discussed  Consent given by: patient  Timeout called at: 2025 9:28 AM.  Patient understanding: patient states " understanding of the procedure being performed  Site marked: the operative site was marked  Patient identity confirmed: verbally with patient    Is this a Visco injection? NoProcedure Details  Location: knee - R knee  Needle size: 22 G  Ultrasound guidance: no  Approach: lateral  Medications administered: 4 mL bupivacaine 0.25 %; 40 mg triamcinolone acetonide 40 mg/mL    Patient tolerance: patient tolerated the procedure well with no immediate complications  Dressing:  Sterile dressing applied

## 2025-07-14 ENCOUNTER — OFFICE VISIT (OUTPATIENT)
Dept: PODIATRY | Age: 75
End: 2025-07-14
Payer: COMMERCIAL

## 2025-07-14 VITALS — HEIGHT: 62 IN | BODY MASS INDEX: 42.18 KG/M2 | WEIGHT: 229.2 LBS

## 2025-07-14 DIAGNOSIS — M79.672 PAIN IN BOTH FEET: Primary | ICD-10-CM

## 2025-07-14 DIAGNOSIS — M19.072 ARTHRITIS OF LEFT FOOT: ICD-10-CM

## 2025-07-14 DIAGNOSIS — M54.9 BACK PAIN WITH HISTORY OF SPINAL SURGERY: ICD-10-CM

## 2025-07-14 DIAGNOSIS — M79.672 LEFT FOOT PAIN: ICD-10-CM

## 2025-07-14 DIAGNOSIS — R20.0 NUMBNESS OF TOES: ICD-10-CM

## 2025-07-14 DIAGNOSIS — B35.1 ONYCHOMYCOSIS: ICD-10-CM

## 2025-07-14 DIAGNOSIS — M79.671 PAIN IN BOTH FEET: Primary | ICD-10-CM

## 2025-07-14 DIAGNOSIS — Z98.890 BACK PAIN WITH HISTORY OF SPINAL SURGERY: ICD-10-CM

## 2025-07-14 PROCEDURE — 99213 OFFICE O/P EST LOW 20 MIN: CPT | Performed by: STUDENT IN AN ORGANIZED HEALTH CARE EDUCATION/TRAINING PROGRAM

## 2025-07-14 NOTE — ASSESSMENT & PLAN NOTE
- Patient has B/L forefoot numbness/tingling w/ h/o lower back surgery. C/w care per pain mngmt   - EMG B/L LE pending  - Pain mngmt consult 2/5/25 reviewed   - C/w CMO w/ met pad PT note from 1/20/25 reviewed   - F/u prn

## 2025-07-14 NOTE — PROGRESS NOTES
"Name: Shanika Flowers      : 1950      MRN: [id number]  Encounter Provider: Joaquina Soriano DPM  Encounter Date: 2025   Encounter department: [facility] [place]  :  Assessment & Plan  Pain in both feet  Numbness of toes  Back pain with history of spinal surgery  - Patient has B/L forefoot numbness/tingling w/ h/o lower back surgery. C/w care per pain mngmt   - EMG B/L LE pending  - Pain mngmt consult 25 reviewed   - C/w CMO w/ met pad PT note from 25 reviewed   - F/u prn       Arthritis of left foot  Left foot pain  - Left foot pain likely arthritic in nature. FL-guided injection discussed however patient defers  - PT rx given   - f/u prn    Orders:    Ambulatory referral to Physical Therapy; Future    Onychomycosis  - R1. C/w otc for 6-12mo, file nail in thickness weekly  - F/u prn       Prior Imaging   XR right and left foot WB 6v 25: No acute osseous abnormalities noted. B/L plantar and posterior calcaneal spurs. Midfoot arthritis. Shortened 1st metatarsal. H/o 5th HT procedure. HTs.         History of Present Illness   HPI  Shanika Flowers is a 74 y.o. female who presents to clinic for B/L foot check. Doing well with CMOs, slightly less symptoms. Went ot pain mngmt and taking gabapentin but less than recommended due to sleepiness, needs to f/u.   Notes left arch to inner ankle pain intermittent for last few weeks.   Did not get EMG  Also notes right 1st toenail fungus. Doing OTC treatment with improvement.     Initial HPI \"Notes pain to left dorsal foot along 1st TMTJ. Notes numbness and sometimes tinglings to toes and plantar feet. Issues for years. Shoes feel better.   H/o CSI for heel pain. No heel pain today.\"       Review of Systems   Constitutional:  Negative for activity change, chills and fever.   HENT: Negative.     Respiratory:  Negative for cough, chest tightness and shortness of breath.    Cardiovascular:  Negative for chest pain and " "leg swelling.   Endocrine: Negative.    Genitourinary: Negative.    Neurological:  Positive for numbness.   Psychiatric/Behavioral: Negative.  Negative for agitation and behavioral problems.           Objective   Ht 5' 2\" (1.575 m)   Wt 104 kg (229 lb 3.2 oz)   BMI 41.92 kg/m²      Physical Exam  Constitutional:       General: She is not in acute distress.     Appearance: Normal appearance. She is obese. She is not ill-appearing.     Cardiovascular:      Comments: B/L DP pulses 1/4. B/L PT pulses nonpalpable due to edema. Legs to toes warm to cool. Diminished pedal hair. B/L LE mild edema with varicosities.   Pulmonary:      Effort: Pulmonary effort is normal.     Musculoskeletal:         General: Tenderness (dorsal left foot over midfoot, slight medial-plantar arch) and deformity (HTs) present. Normal range of motion.      Comments: No pain with ROM left TMTJs, STJ, TNJ, NCJ     Skin:     General: Skin is warm.      Capillary Refill: Capillary refill takes less than 2 seconds.      Comments: Atrophic skin to LE - thin, dry and shiny.   No open wounds.  Right 1st toenail thickening, yellow discoloration, subungual debris       Neurological:      General: No focal deficit present.      Mental Status: She is alert and oriented to person, place, and time.      Comments: + numbness to B/L feet lesser metatarsal heads distal to toes 2-5.   Psychiatric:         Mood and Affect: Mood normal.           "

## 2025-07-14 NOTE — ASSESSMENT & PLAN NOTE
- Left foot pain likely arthritic in nature. FL-guided injection discussed however patient defers  - PT rx given   - f/u prn    Orders:    Ambulatory referral to Physical Therapy; Future

## 2025-07-17 ENCOUNTER — EVALUATION (OUTPATIENT)
Dept: PHYSICAL THERAPY | Facility: CLINIC | Age: 75
End: 2025-07-17
Attending: STUDENT IN AN ORGANIZED HEALTH CARE EDUCATION/TRAINING PROGRAM
Payer: COMMERCIAL

## 2025-07-17 DIAGNOSIS — M19.072 ARTHRITIS OF LEFT FOOT: ICD-10-CM

## 2025-07-17 DIAGNOSIS — M79.672 LEFT FOOT PAIN: ICD-10-CM

## 2025-07-17 PROCEDURE — 97161 PT EVAL LOW COMPLEX 20 MIN: CPT | Performed by: PHYSICAL THERAPIST

## 2025-07-17 PROCEDURE — 97535 SELF CARE MNGMENT TRAINING: CPT | Performed by: PHYSICAL THERAPIST

## 2025-07-17 NOTE — PROGRESS NOTES
PT Evaluation     Today's date: 2025  Patient name: Shanika Flowers  : 1950  MRN: [id number]  Referring provider: Joaquina Soriano DPM  Dx:   Encounter Diagnosis     ICD-10-CM    1. Arthritis of left foot  M19.072 Ambulatory referral to Physical Therapy      2. Left foot pain  M79.672 Ambulatory referral to Physical Therapy                     Assessment  Impairments: abnormal gait, abnormal or restricted ROM, activity intolerance, impaired balance, impaired physical strength, lacks appropriate home exercise program, pain with function, unable to perform ADL, activity limitations and endurance    Assessment details: PT notes the patient with decrease ROM and strength t/o the bilateral feet and ankle with demonstration of impaired gait pattern and balance leading to increase pain levels and functional limitations with need for course of skilled therapy for 6 weeks with focus on gait/balance, strengthening, manual therapy, analgesic modalities, and HEP.  PT notes the patient was outfitted with custom orthotics to correct the pes planus dysfunction of the bilateral feet.   Understanding of Dx/Px/POC: good     Prognosis: good    Goals  ST.  Initiate HEP   2.  Decrease symptoms by 25-50%   3.  Increase ROM by 25-50%   4.  Increase strength by 1-2 mm grades  LT.  Decrease limitations with standing, walking, and stairs  2.  Decrease limitations with transfers and ADL  3.  DC with HEP     Plan  Patient would benefit from: PT eval and skilled physical therapy  Planned modality interventions: thermotherapy: hydrocollator packs    Planned therapy interventions: manual therapy, neuromuscular re-education, patient/caregiver education, self care, strengthening, stretching, therapeutic exercise, home exercise program, graded exercise, gait training, flexibility and balance    Frequency: 2x week  Duration in weeks: 6  Treatment plan discussed with: patient  Plan details: PT notes review of POC and  findings with the patient who is in agreement with PT recommendations of course of skilled therapy.         Subjective Evaluation    History of Present Illness  Mechanism of injury: Patient reports development of left medial ankle pain with no specific MARTINEZ.  Patient reports pain is worse with standing and walking activities as well as when sitting and driving.  Patient is under the care of podiatrist who is referring the patient for OPPT to address the left foot symptoms.  Patient reports she is retired with significant PMH of HTN, OA, and bilateral foot neuropathy.   Patient Goals  Patient goals for therapy: decreased pain, improved balance, increased motion, increased strength and independence with ADLs/IADLs    Pain  Current pain ratin  At best pain ratin  At worst pain ratin  Location: Left Medial Arch  Quality: discomfort, dull ache and pulling  Relieving factors: rest and change in position  Aggravating factors: walking and standing    Treatments  Current treatment: physical therapy        Objective     Observations   Left Ankle/Foot   Positive for deformity.     Right Ankle/Foot   Positive for deformity.     Additional Observation Details  PT notes pes planus of the bilateral feet     Palpation   Left   Muscle spasm in the posterior tibialis.   Tenderness of the posterior tibialis.     Tenderness   Left Ankle/Foot   Tenderness in the navicular and posterior tibial tendon. No tenderness in the Achilles insertion, fifth metatarsal base, metatarsal head, peroneal tendon and plantar fascia.     Neurological Testing     Reflexes   Left   Patellar (L4): trace (1+)  Achilles (S1): trace (1+)    Right   Patellar (L4): trace (1+)  Achilles (S1): trace (1+)    Active Range of Motion   Left Knee   Normal active range of motion    Right Knee   Normal active range of motion  Left Ankle/Foot   Dorsiflexion (ke): 1 degrees   Plantar flexion: WFL  Inversion: 9 degrees with pain  Eversion: 7 degrees with  pain    Additional Active Range of Motion Details  PT notes decrease ROM and strength t/o the left foot and ankle     Passive Range of Motion   Left Ankle/Foot    Dorsiflexion (ke): 3 degrees   Inversion: WFL  Eversion: 19 degrees with pain    Strength/Myotome Testing     Left Knee   Normal strength  Quadriceps contraction: good    Right Knee   Normal strength  Quadriceps contraction: good    Left Ankle/Foot   Dorsiflexion: 4  Plantar flexion: 4+  Inversion: 4-  Eversion: 4-    Tests   Left Ankle/Foot   Negative for anterior drawer, Homans' sign, metatarsal squeeze, posterior drawer, syndesmosis squeeze, Llanes, valgus tilt and varus tilt.     Swelling   Left Ankle/Foot   Metatarsal heads: 23 cm  Malleoli: 26 cm    Right Ankle/Foot   Metatarsal heads: 23 cm  Malleoli: 26 cm    Ambulation     Observational Gait   Gait: antalgic   Decreased walking speed, stride length and left stance time.              Precautions:  PMH OA, HTN, Bilateral Feet Neuropathy  POC 8/17/25      Manuals 7/17       Left Foot and Ankle Mobs and Stretching         Left Ankle MRE- All Directions                         Neuro Re-Ed        Tandem on Foam with OH S/S         Tandem on Foam with Trunk Rotation         BOSU March         Tandem and Side Step on Foam         Rocker Board- Tandem F/B and S/S                         Ther Ex        SRC for ROM and Strength         Front and Lateral Step Up         Stand 1/2 Foam I/E        Stand 1/2 Foam PF/DF        Stair HR/TR        Stair HR/TR with Tennis Ball Squeeze                 HEP update/review  15 min        Ther Activity                        Gait Training                        Modalities        MHP PRN

## 2025-07-24 NOTE — PROGRESS NOTES
"Daily Note     Today's date: 2025  Patient name: Shanika Flowers  : 1950  MRN: [id number]  Referring provider: Joaquina Sroiano DPM  Dx:   Encounter Diagnosis     ICD-10-CM    1. Left foot pain  M79.672       2. Arthritis of left foot  M19.072                      Subjective: Patient reports to PT ready and enthusiastic to begin her program this morning.      Objective: See treatment diary below      Assessment: Tolerated treatment well.  PT notes start of POC with focus on strengthening and manual therapy to decrease symptoms and improve functional limitations to meet therapy goals.  PT notes continuation of decrease ROM and strength t/o the left foot and ankle with demonstration of impaired gait pattern and balance with need for continuation of skilled therapy.       Plan: Continue per plan of care.      Precautions:  PMH OA, HTN, Bilateral Feet Neuropathy  POC 25      Manuals       Left Foot and Ankle Mobs and Stretching   10'      Left Ankle MRE- All Directions   5'                      Neuro Re-Ed        Tandem on Foam with OH S/S         Tandem on Foam with Trunk Rotation         BOSU March         Tandem and Side Step on Foam         Rocker Board- Tandem F/B and S/S   10xea                      Ther Ex        SRC for ROM and Strength   10'L1      Front and Lateral Step Up   10xea bilat 4\"step      Stand 1/2 Foam I/E  10x5\"      Stand 1/2 Foam PF/DF  10x5\"      Stair HR/TR  2x10 3\"hold 4\"step      Stair HR/TR with Tennis Ball Squeeze                 HEP update/review  15 min        Ther Activity                        Gait Training                        Modalities        MHP PRN                     "

## 2025-07-25 ENCOUNTER — OFFICE VISIT (OUTPATIENT)
Dept: PHYSICAL THERAPY | Facility: CLINIC | Age: 75
End: 2025-07-25
Attending: STUDENT IN AN ORGANIZED HEALTH CARE EDUCATION/TRAINING PROGRAM
Payer: COMMERCIAL

## 2025-07-25 DIAGNOSIS — M79.672 LEFT FOOT PAIN: Primary | ICD-10-CM

## 2025-07-25 DIAGNOSIS — M19.072 ARTHRITIS OF LEFT FOOT: ICD-10-CM

## 2025-07-25 PROCEDURE — 97110 THERAPEUTIC EXERCISES: CPT

## 2025-07-25 PROCEDURE — 97140 MANUAL THERAPY 1/> REGIONS: CPT

## 2025-07-25 PROCEDURE — 97112 NEUROMUSCULAR REEDUCATION: CPT

## 2025-07-31 ENCOUNTER — OFFICE VISIT (OUTPATIENT)
Dept: PHYSICAL THERAPY | Facility: CLINIC | Age: 75
End: 2025-07-31
Attending: STUDENT IN AN ORGANIZED HEALTH CARE EDUCATION/TRAINING PROGRAM
Payer: COMMERCIAL

## 2025-07-31 DIAGNOSIS — M79.672 LEFT FOOT PAIN: Primary | ICD-10-CM

## 2025-07-31 DIAGNOSIS — M19.072 ARTHRITIS OF LEFT FOOT: ICD-10-CM

## 2025-07-31 PROCEDURE — 97140 MANUAL THERAPY 1/> REGIONS: CPT | Performed by: PHYSICAL THERAPIST

## 2025-07-31 PROCEDURE — 97110 THERAPEUTIC EXERCISES: CPT | Performed by: PHYSICAL THERAPIST

## 2025-07-31 PROCEDURE — 97112 NEUROMUSCULAR REEDUCATION: CPT | Performed by: PHYSICAL THERAPIST

## 2025-08-01 ENCOUNTER — TELEPHONE (OUTPATIENT)
Age: 75
End: 2025-08-01

## 2025-08-07 ENCOUNTER — OFFICE VISIT (OUTPATIENT)
Dept: PHYSICAL THERAPY | Facility: CLINIC | Age: 75
End: 2025-08-07
Attending: STUDENT IN AN ORGANIZED HEALTH CARE EDUCATION/TRAINING PROGRAM
Payer: COMMERCIAL

## 2025-08-07 DIAGNOSIS — M19.072 ARTHRITIS OF LEFT FOOT: ICD-10-CM

## 2025-08-07 DIAGNOSIS — M79.672 LEFT FOOT PAIN: Primary | ICD-10-CM

## 2025-08-07 PROCEDURE — 97110 THERAPEUTIC EXERCISES: CPT

## 2025-08-07 PROCEDURE — 97140 MANUAL THERAPY 1/> REGIONS: CPT

## 2025-08-07 PROCEDURE — 97112 NEUROMUSCULAR REEDUCATION: CPT

## 2025-08-14 ENCOUNTER — OFFICE VISIT (OUTPATIENT)
Dept: PHYSICAL THERAPY | Facility: CLINIC | Age: 75
End: 2025-08-14
Attending: STUDENT IN AN ORGANIZED HEALTH CARE EDUCATION/TRAINING PROGRAM
Payer: COMMERCIAL

## 2025-08-21 ENCOUNTER — EVALUATION (OUTPATIENT)
Dept: PHYSICAL THERAPY | Facility: CLINIC | Age: 75
End: 2025-08-21
Attending: STUDENT IN AN ORGANIZED HEALTH CARE EDUCATION/TRAINING PROGRAM
Payer: COMMERCIAL

## 2025-08-21 DIAGNOSIS — M79.672 LEFT FOOT PAIN: Primary | ICD-10-CM

## 2025-08-21 DIAGNOSIS — M19.072 ARTHRITIS OF LEFT FOOT: ICD-10-CM

## 2025-08-21 PROCEDURE — 97535 SELF CARE MNGMENT TRAINING: CPT | Performed by: PHYSICAL THERAPIST

## 2025-08-21 PROCEDURE — 97140 MANUAL THERAPY 1/> REGIONS: CPT | Performed by: PHYSICAL THERAPIST
